# Patient Record
Sex: FEMALE | Race: WHITE | Employment: FULL TIME | URBAN - METROPOLITAN AREA
[De-identification: names, ages, dates, MRNs, and addresses within clinical notes are randomized per-mention and may not be internally consistent; named-entity substitution may affect disease eponyms.]

---

## 2017-01-02 ENCOUNTER — DOCUMENTATION ONLY (OUTPATIENT)
Dept: SLEEP MEDICINE | Age: 57
End: 2017-01-02

## 2017-01-02 ENCOUNTER — OFFICE VISIT (OUTPATIENT)
Dept: SLEEP MEDICINE | Age: 57
End: 2017-01-02

## 2017-01-02 VITALS — BODY MASS INDEX: 36.44 KG/M2 | HEIGHT: 62 IN | WEIGHT: 198 LBS

## 2017-01-02 DIAGNOSIS — G47.33 OSA (OBSTRUCTIVE SLEEP APNEA): Primary | ICD-10-CM

## 2017-01-02 NOTE — PROGRESS NOTES
217 [facility] Gael. [place], [address]  Tel.  [phone]  Fax. 100 [facility] 60  [place], [address] [facility]  Tel.  [phone]  Fax. [phone] [address]   Tel.  [phone]  Fax. [phone]         Subjective:      Naye Mcduffie is an 64 y.o. female referred for evaluation for a sleep disorder. She complains of snoring associated with snorting, periods of not breathing. Symptoms began several years ago, gradually worsening since that time. She usually can fall asleep in 5 minutes. Family or house members note snoring, snorting, periods of not breathing. She denies completely or partially paralyzed while falling asleep or waking up. Naye Mcduffie does wake up frequently at night. She is not bothered by waking up too early and left unable to get back to sleep. She actually sleeps about 7 hours at night and wakes up about 3 times during the night. She does not work shifts:  .   Feliz Pill indicates she does not get too little sleep at night. Her bedtime is 2200. She awakens at 0700. She does not take naps. She has the following observed behaviors: Light snoring, Grinding teeth;  . Other remarks: waking with gasp - severe episode early October and a milder episode last week. [place] Sleepiness Score: 4     Allergies   Allergen Reactions    Sulfa (Sulfonamide Antibiotics) Rash         Current Outpatient Prescriptions:     OTHER, , Disp: , Rfl:     Pseudoephedrine-Ibuprofen (ADVIL COLD AND SINUS)  mg cap, Take  by mouth., Disp: , Rfl:     ondansetron (ZOFRAN ODT) 4 mg disintegrating tablet, Take 1 Tab by mouth every eight (8) hours as needed for Nausea., Disp: 20 Tab, Rfl: 0     She  has a past medical history of Abnormal antinuclear antibody titer (5/9/2010); Abnormal Pap smear (5/9/2010); Environmental allergies (5/9/2010); TMJ (dislocation of temporomandibular joint) (5/9/2010); and Trauma.  She also has no past medical history of Abuse; Anemia NEC; Arrhythmia; Calculus of kidney; Chronic pain; Congestive heart failure, unspecified; Contact dermatitis and other eczema, due to unspecified cause; Depression; GERD (gastroesophageal reflux disease); Headache(784.0); Hypercholesterolemia; Psychotic disorder; or Thyroid disease. She  has no past surgical history on file. She family history includes Heart Disease in her father. She  reports that she has quit smoking. She smoked 0.25 packs per day. She has never used smokeless tobacco. She reports that she does not drink alcohol or use illicit drugs. Review of Systems:  Constitutional:  No significant weight loss or weight gain  Eyes:  No blurred vision  CVS:  No significant chest pain  Pulm:  No significant shortness of breath  GI:  No significant nausea or vomiting  :  No significant nocturia  Musculoskeletal:  No significant joint pain at night  Skin:  No significant rashes  Neuro:  No significant dizziness   Psych:  No active mood issues    Sleep Review of Systems: notable for no difficulty falling asleep; frequent awakenings at night;  regular dreaming noted; no nightmares ; no early morning headaches; no memory problems; no concentration issues; no history of any automobile or occupational accidents due to daytime drowsiness. Objective:     Visit Vitals     5' 2\" (1.575 m)    Wt 198 lb (89.8 kg)    LMP 07/05/2011    BMI 36.21 kg/m2         General:   Not in acute distress   Eyes:  Anicteric sclerae, no obvious strabismus   Nose:  No obvious nasal septum deviation    Oropharynx:   Class 3 oropharyngeal outlet, thick tongue base, uvula could not be seen due to low-lying soft palate, narrow tonsilo-pharyngeal pilars   Tonsils:   tonsils are not seen due to low-lying soft palate   Neck:   Neck circ.  in \"inches\": 13; midline trachea   Chest/Lungs:  Equal lung expansion, clear on auscultation    CVS:  Normal rate, regular rhythm; no JVD   Skin:  Warm to touch; no obvious rashes   Neuro:  No focal deficits ; no obvious tremor    Psych:  Normal affect,  normal countenance;          Assessment:       ICD-10-CM ICD-9-CM    1. GUILLERMO (obstructive sleep apnea) G47.33 327.23 SLEEP STUDY UNATTENDED, 4 CHANNEL   2. BMI 36.0-36.9,adult Z68.36 V85.36          Plan:     * The patient currently has a Moderate Risk for having sleep apnea. STOP-BANG score 4.  * Sleep testing was ordered for initial evaluation. * She was provided information on sleep apnea including coresponding risk factors and the importance of proper treatment. * Treatment options if indicated were reviewed today. Patient agrees to a trial of OAT therapy if indicated. (Dr. Lyons Brothers). * Counseling was provided regarding proper sleep hygiene and safe driving. * Effect of sleep disturbance on weight was reviewed. We have recommended a dedicated weight loss through appropriate diet and an exercise regiment as significant weight reduction has been shown to reduce severity of obstructive sleep apnea. * Telephone [phone]  follow-up shortly after sleep study to review results and plan final management.     (patient has given permission for a message to be left regarding test results and further management if patient cannot be cannot be reached directly). Thank you for allowing us to participate in your patient's medical care. We'll keep you updated on these investigations. Cheko Simental MD, FAASM  Diplomate American Board of Sleep Medicine  Diplomate in Sleep Medicine - ABP  Electronically signed.

## 2017-01-02 NOTE — PATIENT INSTRUCTIONS
217 [facility]., Gael. [place], [address]  Tel.  [phone]  Fax. 100 [facility] 60  [place], [address] [facility]  Tel.  [phone]  Fax. [phone] [address]  Tel.  [phone]  Fax. [phone]     Sleep Apnea: After Your Visit  Your Care Instructions  Sleep apnea occurs when you frequently stop breathing for 10 seconds or longer during sleep. It can be mild to severe, based on the number of times per hour that you stop breathing or have slowed breathing. Blocked or narrowed airways in your nose, mouth, or throat can cause sleep apnea. Your airway can become blocked when your throat muscles and tongue relax during sleep. Sleep apnea is common, occurring in 1 out of 20 individuals. Individuals having any of the following characteristics should be evaluated and treated right away due to high risk and detrimental consequences from untreated sleep apnea:  1. Obesity  2. Congestive Heart failure  3. Atrial Fibrillation  4. Uncontrolled Hypertension  5. Type II Diabetes  6. Night-time Arrhythmias  7. Stroke  8. Pulmonary Hypertension  9. High-risk Driving Populations (pilots, truck drivers, etc.)  10. Patients Considering Weight-loss Surgery    How do you know you have sleep apnea? You probably have sleep apnea if you answer 'yes' to 3 or more of the following questions:  S - Have you been told that you Snore? T - Are you often Tired during the day? O - Has anyone Observed you stop breathing while sleeping? P- Do you have (or are being treated for) high blood Pressure? B - Are you obese (Body Mass Index > 35)? A - Is your Age 48years old or older? N - Is your Neck size greater than 16 inches? G - Are you male Gender? A sleep physician can prescribe a breathing device that prevents tissues in the throat from blocking your airway.  Or your doctor may recommend using a dental device (oral breathing device) to help keep your airway open. In some cases, surgery may be needed to remove enlarged tissues in the throat. Follow-up care is a key part of your treatment and safety. Be sure to make and go to all appointments, and call your doctor if you are having problems. It's also a good idea to know your test results and keep a list of the medicines you take. How can you care for yourself at home? · Lose weight, if needed. It may reduce the number of times you stop breathing or have slowed breathing. · Go to bed at the same time every night. · Sleep on your side. It may stop mild apnea. If you tend to roll onto your back, sew a pocket in the back of your pajama top. Put a tennis ball into the pocket, and stitch the pocket shut. This will help keep you from sleeping on your back. · Avoid alcohol and medicines such as sleeping pills and sedatives before bed. · Do not smoke. Smoking can make sleep apnea worse. If you need help quitting, talk to your doctor about stop-smoking programs and medicines. These can increase your chances of quitting for good. · Prop up the head of your bed 4 to 6 inches by putting bricks under the legs of the bed. · Treat breathing problems, such as a stuffy nose, caused by a cold or allergies. · Use a continuous positive airway pressure (CPAP) breathing machine if lifestyle changes do not help your apnea and your doctor recommends it. The machine keeps your airway from closing when you sleep. · If CPAP does not help you, ask your doctor whether you should try other breathing machines. A bilevel positive airway pressure machine has two types of air pressureâone for breathing in and one for breathing out. Another device raises or lowers air pressure as needed while you breathe. · If your nose feels dry or bleeds when using one of these machines, talk with your doctor about increasing moisture in the air. A humidifier may help.   · If your nose is runny or stuffy from using a breathing machine, talk with your doctor about using decongestants or a corticosteroid nasal spray. When should you call for help? Watch closely for changes in your health, and be sure to contact your doctor if:  · You still have sleep apnea even though you have made lifestyle changes. · You are thinking of trying a device such as CPAP. · You are having problems using a CPAP or similar machine. Where can you learn more? Go to [employer]. Enter I344 in the search box to learn more about \"Sleep Apnea: After Your Visit. \"   © 6597-5955 Healthwise, [employer]. Care instructions adapted under license by Karla Gomez (which disclaims liability or warranty for this information). This care instruction is for use with your licensed healthcare professional. If you have questions about a medical condition or this instruction, always ask your healthcare professional. Raymond Littleon any warranty or liability for your use of this information. PROPER SLEEP HYGIENE    What to avoid  · Do not have drinks with caffeine, such as coffee or black tea, for 8 hours before bed. · Do not smoke or use other types of tobacco near bedtime. Nicotine is a stimulant and can keep you awake. · Avoid drinking alcohol late in the evening, because it can cause you to wake in the middle of the night. · Do not eat a big meal close to bedtime. If you are hungry, eat a light snack. · Do not drink a lot of water close to bedtime, because the need to urinate may wake you up during the night. · Do not read or watch TV in bed. Use the bed only for sleeping and sexual activity. What to try  · Go to bed at the same time every night, and wake up at the same time every morning. Do not take naps during the day. · Keep your bedroom quiet, dark, and cool. · Get regular exercise, but not within 3 to 4 hours of your bedtime. .  · Sleep on a comfortable pillow and mattress.   · If watching the clock makes you anxious, turn it facing away from you so you cannot see the time. · If you worry when you lie down, start a worry book. Well before bedtime, write down your worries, and then set the book and your concerns aside. · Try meditation or other relaxation techniques before you go to bed. · If you cannot fall asleep, get up and go to another room until you feel sleepy. Do something relaxing. Repeat your bedtime routine before you go to bed again. · Make your house quiet and calm about an hour before bedtime. Turn down the lights, turn off the TV, log off the computer, and turn down the volume on music. This can help you relax after a busy day. Drowsy Driving  The [address] [place] [address] Road Traffic Safety Administration cites drowsiness as a causing factor in more than 921,543 police reported crashes annually, resulting in 76,000 injuries and 1,500 deaths. Other surveys suggest 55% of people polled have driven while drowsy in the past year, 23% had fallen asleep but not crashed, 3% crashed, and 2% had and accident due to drowsy driving. Who is at risk? Young Drivers: One study of drowsy driving accidents states that 55% of the drivers were under 25 years. Of those, 75% were male. Shift Workers and Travelers: People who work overnight or travel across time zones frequently are at higher risk of experiencing Circadian Rhythm Disorders. They are trying to work and function when their body is programed to sleep. Sleep Deprived: Lack of sleep has a serious impact on your ability to pay attention or focus on a task. Consistently getting less than the average of 8 hours your body needs creates partial or cumulative sleep deprivation. Untreated Sleep Disorders: Sleep Apnea, Narcolepsy, R.L.S., and other sleep disorders (untreated) prevent a person from getting enough restful sleep. This leads to excessive daytime sleepiness and increases the risk for drowsy driving accidents by up to 7 times.   Medications / Alcohol: Even over the counter medications can cause drowsiness. Medications that impair a drivers attention should have a warning label. Alcohol naturally makes you sleepy and on its own can cause accidents. Combined with excessive drowsiness its effects are amplified. Signs of Drowsy Driving:   * You don't remember driving the last few miles   * You may drift out of your vahid   * You are unable to focus and your thoughts wander   * You may yawn more often than normal   * You have difficulty keeping your eyes open / nodding off   * Missing traffic signs, speeding, or tailgating  Prevention-   Good sleep hygiene, lifestyle and behavioral choices have the most impact on drowsy driving. There is no substitute for sleep and the average person requires 8 hours nightly. If you find yourself driving drowsy, stop and sleep. Consider the sleep hygiene tips provided during your visit as well. Medication Refill Policy: Refills for all medications require 1 week advance notice. Please have your pharmacy fax a refill request. We are unable to fax, or call in \"controled substance\" medications and you will need to pick these prescriptions up from our office. [employer] Activation    Thank you for requesting access to [employer]. Please follow the instructions below to securely access and download your online medical record. [employer] allows you to send messages to your doctor, view your test results, renew your prescriptions, schedule appointments, and more. How Do I Sign Up? 1. In your internet browser, go to [email or web address]. 2. Click on the First Time User? Click Here link in the Sign In box. You will see the New Member Sign Up page. 3. Enter your [employer] Access Code exactly as it appears below. You will not need to use this code after youve completed the sign-up process. If you do not sign up before the expiration date, you must request a new code.     [employer] Access Code: [email or web address]  Expires: 4/2/2017 12:46 PM (This is the date your [employer] access code will )    4. Enter the last four digits of your Social Security Number (xxxx) and Date of Birth (mm/dd/yyyy) as indicated and click Submit. You will be taken to the next sign-up page. 5. Create a PhilSmilet ID. This will be your Traxpay login ID and cannot be changed, so think of one that is secure and easy to remember. 6. Create a [employer] password. You can change your password at any time. 7. Enter your Password Reset Question and Answer. This can be used at a later time if you forget your password. 8. Enter your e-mail address. You will receive e-mail notification when new information is available in [address] 9. Click Sign Up. You can now view and download portions of your medical record. 10. Click the Download Summary menu link to download a portable copy of your medical information. Additional Information    If you have questions, please call [phone]. Remember, [employer]xpay is NOT to be used for urgent needs. For medical emergencies, dial 911.

## 2017-01-02 NOTE — PROGRESS NOTES
· Patient was educated on proper hookup and operation of the HST. · Instruction forms and documentation were reviewed and signed. · The patient demonstrated good understanding of the HST. · Insurance has not yet authorized use of HST at this time. We will contact patient to pickup the device once authorization is obtained.

## 2017-01-03 ENCOUNTER — TELEPHONE (OUTPATIENT)
Dept: SLEEP MEDICINE | Age: 57
End: 2017-01-03

## 2017-01-03 NOTE — TELEPHONE ENCOUNTER
Left message to call back to schedule . Does not need education. Also she will have to come after 10th January as the sleep study has been approved from 11th January - 10th April 2017.

## 2017-01-10 ENCOUNTER — DOCUMENTATION ONLY (OUTPATIENT)
Dept: SLEEP MEDICINE | Age: 57
End: 2017-01-10

## 2017-01-10 NOTE — PROGRESS NOTES
Patient picked up HSAT in office today, notified patient to start sleep study 1/11/17 per insurance coverage dates. Patient will return HST 1/12/17.  Patient verbalized understanding

## 2017-01-12 ENCOUNTER — HOSPITAL ENCOUNTER (OUTPATIENT)
Dept: SLEEP MEDICINE | Age: 57
Discharge: HOME | End: 2017-01-12
Payer: COMMERCIAL

## 2017-01-12 PROCEDURE — 95806 SLEEP STUDY UNATT&RESP EFFT: CPT | Performed by: INTERNAL MEDICINE

## 2017-01-13 ENCOUNTER — TELEPHONE (OUTPATIENT)
Dept: SLEEP MEDICINE | Age: 57
End: 2017-01-13

## 2017-01-13 DIAGNOSIS — G47.33 OSA (OBSTRUCTIVE SLEEP APNEA): Primary | ICD-10-CM

## 2017-01-13 NOTE — TELEPHONE ENCOUNTER
HSAT Returned    Date of Study: 1/12/17    The following information was gathered from the patients study log:    · Lights off: 11:00p  · Estimated sleep onset: 11:15p    · Awakened a total of 2-3 times  · The patient felt they slept 7 hours  · Patient took no sleep aid before starting the test  · Sleep quality was worse compared to a usual nights sleep. Further information provided: \" was out of town. Maybe that's why I kept waking up. However, if he were there, his snoring would wake me. Also had neck pain from earlier in the week. \"

## 2017-01-13 NOTE — TELEPHONE ENCOUNTER
Home sleep testing was performed and the results of the study were explained to the patient. Please refer to interpretation report for further details. Apnea/Hypopnea index of 17.5, SpO2 yanick was 64% which indicates moderate apnea. She continues to have snoring and wakes up gasping for air. Orders Placed This Encounter    REFERRAL TO DENTISTRY     Referral Priority:   Routine     Referral Type:   Consultation     Referral Reason:   Specialty Services Required     Referred to Provider:   Terrence Guzman DMD     Number of Visits Requested:   1         [facility] was provided information on sleep apnea including coresponding risk factors and the importance of proper treatment including positional therapy (tennis ball technique discussed). * We have referred the patient to Dentistry (Dr. Yuly Ruvalcaba) for oral appliance evaluation.

## 2017-01-16 ENCOUNTER — DOCUMENTATION ONLY (OUTPATIENT)
Dept: SLEEP MEDICINE | Age: 57
End: 2017-01-16

## 2017-02-09 ENCOUNTER — DOCUMENTATION ONLY (OUTPATIENT)
Dept: SLEEP MEDICINE | Age: 57
End: 2017-02-09

## 2017-02-12 ENCOUNTER — OFFICE VISIT (OUTPATIENT)
Dept: FAMILY MEDICINE CLINIC | Age: 57
End: 2017-02-12

## 2017-02-12 VITALS
WEIGHT: 198 LBS | SYSTOLIC BLOOD PRESSURE: 138 MMHG | HEIGHT: 62 IN | BODY MASS INDEX: 36.44 KG/M2 | DIASTOLIC BLOOD PRESSURE: 71 MMHG | TEMPERATURE: 98 F | RESPIRATION RATE: 16 BRPM | OXYGEN SATURATION: 97 % | HEART RATE: 73 BPM

## 2017-02-12 DIAGNOSIS — Z12.11 COLON CANCER SCREENING: ICD-10-CM

## 2017-02-12 DIAGNOSIS — Z13.220 LIPID SCREENING: ICD-10-CM

## 2017-02-12 DIAGNOSIS — Z11.59 NEED FOR HEPATITIS C SCREENING TEST: ICD-10-CM

## 2017-02-12 DIAGNOSIS — F41.0 ANXIETY ATTACK: ICD-10-CM

## 2017-02-12 DIAGNOSIS — Z13.31 DEPRESSION SCREEN: ICD-10-CM

## 2017-02-12 DIAGNOSIS — H93.239 HEARING ABNORMALLY ACUTE, UNSPECIFIED LATERALITY: Primary | ICD-10-CM

## 2017-02-12 RX ORDER — BUSPIRONE HYDROCHLORIDE 5 MG/1
5 TABLET ORAL
Qty: 30 TAB | Refills: 1 | Status: SHIPPED | OUTPATIENT
Start: 2017-02-12 | End: 2018-02-05 | Stop reason: ALTCHOICE

## 2017-02-12 NOTE — LETTER
2/12/2017 8:46 AM 
 
Ms. Ej Corral [address] [id number] Focus on regular exercise (150 minutes each week) and healthy eating. Eat more fruits and vegetables. Eat more protein (egg whites, beans, and nuts you know you tolerate) and less carbohydrates (white bread, white rice, white pasta, white potatoes, sodas, and sweets). Eat appropriately small portion sizes. Sincerely, Jake Ramirez MD

## 2017-02-12 NOTE — MR AVS SNAPSHOT
Visit Information Johnson Peabody y [place] Personal Médico Departamento Teléfono del Dep. Número de visita 2/12/2017  8:15 AM Zbigniew Flores MD [facility] [phone] Upcoming Health Maintenance Date Due FOBT Q 1 YEAR AGE 50-75 2/12/2018 BREAST CANCER SCRN MAMMOGRAM 9/1/2018 PAP AKA CERVICAL CYTOLOGY 9/1/2019 DTaP/Tdap/Td series (3 - Td) 10/3/2025 Alergias  Review Complete El: 2/12/2017 Por: MD Kathia Klineah Moldovan del:  2/12/2017 Intensidad Anotado Tipo de reacción [employer] Sulfa (Sulfonamide Antibiotics)  05/09/2010    Rash Vacunas actuales Revisadas el:  9/29/2010 Lisa Leisure TD Vaccine 10/24/2009 TDAP Vaccine 9/29/2010 Td, Adsorbed PF 10/3/2015 No revisadas esta visita You Were Diagnosed With   
  
 Rinda Porum Hearing abnormally acute, unspecified laterality    -  Primary ICD-10-CM: D91.654 ICD-9-CM: 989.27 Need for hepatitis C screening test     ICD-10-CM: Z11.59 
ICD-9-CM: V73.89 Lipid screening     ICD-10-CM: D21.460 ICD-9-CM: V77.91 Colon cancer screening     ICD-10-CM: Z12.11 ICD-9-CM: V76.51 Anxiety attack     ICD-10-CM: F41.0 ICD-9-CM: 300.01 Depression screen     ICD-10-CM: Z13.89 ICD-9-CM: V79.0 Partes vitales PS Pulso Temperatura Resp [place] ( percentil de crecimiento) Peso (percentil de crecimiento)  
 138/71 (BP 1 Location: Right arm, BP Patient Position: Sitting) 73 98 °F (36.7 °C) (Oral) 16 5' 2\" (1.575 m) 198 lb (89.8 kg) LMP (última amol) SpO2 BMI (IMC) Estado obstétrico Estatus de tabaquísmo 07/05/2011 97% 36.21 kg/m2 Postmenopausal Former Smoker BMI and BSA Data Body Mass Index Body Surface Area  
 36.21 kg/m 2 1.98 m 2 [facility] Name Phone [address], [address] [phone] Justin lista de medicamentos actualizada Lista actualizada el: 2/12/17  8:55 AM.  Jeanie Griffith use reynoso lista de medicamentos más reciente. busPIRone 5 mg tablet También conocido bill:  BUSPAR Take 1 Tab by mouth three (3) times daily as needed. Impresion de recetas Refills  
 busPIRone (BUSPAR) 5 mg tablet 1 Sig: Take 1 Tab by mouth three (3) times daily as needed. Class: Print Route: Oral  
  
Hicimos lo siguiente AMB POC FECAL BLOOD, OCCULT, QL 3 CARDS [56422 CPT(R)] CBC W/O DIFF [50946 CPT(R)] CHOLESTEROL, HDL [id number] CPT(R)] CHOLESTEROL, TOTAL [18693 CPT(R)] HEPATITIS C AB [17136 CPT(R)] LDL, DIRECT [id number] CPT(R)] METABOLIC PANEL, COMPREHENSIVE [52488 CPT(R)] VT PT-FOCUSED HLTH RISK ASSMT SCORE DOC STND INSTRM [75289 CPT(R)] REFERRAL TO GASTROENTEROLOGY [AUD36 Custom] Comentarios:  
 Please evaluate patient for colonoscopy. TSH 3RD GENERATION [29305 CPT(R)] Informacion de [employer] de Referencia Referido por Referido a  
  
 [id number] Todd Mcginnis [facility] [place] [facility] AT [facility] [address] Phone: [phone] Fax: [phone] Visitas Estado Regine Myers de inicio Regine Myers final  
 1 New Request 2/12/17 2/12/18 Si reynoso referencia tiene un estado de \"pending review\" o \"denied\" , informacion adicional sera enviada para apoyar el resultado de esta decision. Instrucciones para el Paciente Labs soon Keep up with female exams Use buspar as needed for anxiety Follow for CPAP with sleep specialist 
 
Obtain colonoscopy Return your stool cards when ready Please call Chew Samples to help arrange and authorize any tests and/or referrals. Her # is [phone] Central Scheduling at [facility] is [phone] Introducing [facility] SERVICES! [facility] introduce portal paciente MyChart .  Ahora se puede acceder a partes de reynoso expediente médico, enviar por correo electrónico la oficina de reynoso médico y solicitar renovaciones de medicamentos en línea. En reynoso navegador de Internet , Liv Ro a [email or web address] Rosy clic en el usuario por Brook Ankush? Lisandro Saucedo clic aquí en la sesión Chandana Waggoner. Verá la página de registro [place]. Ingrese reynoso código de Bank [facility] ranjana y bill aparece a continuación. Usted no tendrá que [employer] código después de lakesha completado el proceso de registro . Si usted no se inscribe antes de la fecha de caducidad , debe solicitar un nuevo código. · MyChart Código de acceso : [id number] Expires: 4/2/2017 12:46 PM 
 
Ingresa los últimos cuatro dígitos de reynoso Número de Seguro Social ( xxxx ) y fecha de nacimiento ( dd / mm / aaaa ) bill se indica y rosy clic en Enviar. Usted será llevado a la siguiente página de registro . Crear un ID MyChart . Esta será reynoso ID de inicio de sesión de MyChart y no puede ser [place] , por lo que pensar en marion que es Cara Joseluis y fácil de recordar . Crear marion contraseña MyChart . Usted puede cambiar reynoso contraseña en cualquier momento . Ingrese reynoso Password Reset de preguntas y [place] . [place] se puede utilizar en un momento posterior si usted olvida reynoso contraseña. Introduzca reynoso dirección de correo electrónico . Cheryal Rinne recibirá marion notificación por correo electrónico cuando la nueva información está disponible en MyChart . Robbie Cisse clic en Registrarse. Candido Johnson tyra y descargar porciones de reynoso expediente médico. 
Rosy clic en el enlace de descarga del menú Resumen para descargar marion copia portátil de reynoso información médica . Si tiene [employer] , por favor visite la sección de preguntas frecuentes del sitio web MyChart . Recuerde, MyChart NO es que se utilizará para las necesidades urgentes. Para emergencias médicas , llame al 911 . Ahora disponible en reynoso iPhone y Android ! Por favor proporcione konstantin resumen de la documentación de cuidado a reynoso próximo proveedor. Your primary care clinician is listed as Stu Maza. If you have any questions after today's visit, please call [phone].

## 2017-02-12 NOTE — PROGRESS NOTES
Erin Hayes is a 64 y.o. female      Issues discussed today include:        Signs and symptoms:  Hyperacute hearing  Duration:  One week ago  Context:  Made her anxious  Location:  Both ears  Quality:  Everything was loud and it hurt  Severity:  significant  Timing:  Has resolved  Modifying factors:  She has TMJ and is currently being evaluated for GUILLERMO. She took one of her husbands SALLY and felt better    Data reviewed or ordered today:  Labs soon    Other problems include:  Patient Active Problem List   Diagnosis Code    TMJ (dislocation of temporomandibular joint) S03. 00XA    Abnormal antinuclear antibody titer R76.8    DDD (degenerative disc disease) [id number]    Abnormal Pap smear [id number]    Unspecified vitamin D deficiency E55.9    FH: diabetes mellitus Z83.3    History of normal resting EKG [id number]    Macrocytosis without anemia D75.89    Lipid disorder E78.9    Chest pain, unspecified R07.9    HTN (hypertension) I10    Dizzy R42    Acute diverticulitis K57.92    Obesity (BMI 35.0-39.9 without comorbidity) (HCC) E66.9       Medications:  Current Outpatient Prescriptions   Medication Sig Dispense Refill    busPIRone (BUSPAR) 5 mg tablet Take 1 Tab by mouth three (3) times daily as needed. 30 Tab 1       Allergies: Allergies   Allergen Reactions    Sulfa (Sulfonamide Antibiotics) Rash       LMP:  Patient's last menstrual period was 07/05/2011. Social History     Social History    Marital status:      Spouse name: N/A    Number of children: N/A    Years of education: N/A     Occupational History    Not on file.      Social History Main Topics    Smoking status: Former Smoker     Packs/day: 0.25    Smokeless tobacco: Never Used      Comment: quit in 2016 (started in late in 45s)    Alcohol use No      Comment: ocassional    Drug use: No    Sexual activity: Yes     Partners: Male     Other Topics Concern    Not on file     Social History Narrative         Family History Problem Relation Age of Onset    Heart Disease Father          Meaningful use:  done      ROS:  Headaches:  no  Chest Pain:  no  SOB:  no  Fevers:  no  Other significant ROS:      Patient's last menstrual period was 07/05/2011. Physical Exam  Visit Vitals    /71 (BP 1 Location: Right arm, BP Patient Position: Sitting)    Pulse 73    Temp 98 °F (36.7 °C) (Oral)    Resp 16    Ht 5' 2\" (1.575 m)    Wt 198 lb (89.8 kg)    LMP 07/05/2011    SpO2 97%    BMI 36.21 kg/m2     BP Readings from Last 3 Encounters:   02/12/17 138/71   10/15/15 122/77   10/12/15 149/80     Constitutional:  Appears well,  No Acute Distress, Vitals noted  Psychiatric:   Affect normal, Alert and cooperative, Oriented to person/place/time    Eyes:   Pupils equally round and reactive, EOMI, conjunctiva clear, eyelids normal  ENT:   External ears and nose normal/lips, teeth=OK/gums normal, TMs and Orophyarynx normal  Neck:   general inspection and Thyroid normal.  No abnormal cervical or supraclavicular nodes    Lungs:   clear to auscultation, good respiratory effort  Heart: Ausculation normal.  Regular rhythm. No cardiac murmurs.   No carotid bruits or palpable thrills  Chest wall normal  Abd:  benign  Extremities:   without edema, good peripheral pulses  Skin:   Warm to palpation, without rashes, bruising, or suspicious lesions   Neuro:  No facial droop, speech fluent, EOMI, Pupils equally round and reactive to light, visual fields seem OK, hearing seems normal and symmetrical,smile symmetrical, puffs out cheeks symmetrically    Shoulder shrug symmetrical     moves all extremities, strength/sensationseem intact and symmetrical    Rapid alternating movements of hands normal and symmetrical    balance seems OK, no pronator drift, gait normal. \"get up and go\" test OK    squats OK, heel standing/toe standing OK    no tenderness of C spine, T spine, LS spine, flexion/extension of spine OK    Affect seems appropriate, no obvious mental processing problems    MSK:  Full ROM all joints                  Assessment:    Patient Active Problem List   Diagnosis Code    TMJ (dislocation of temporomandibular joint) S03. 00XA    Abnormal antinuclear antibody titer R76.8    DDD (degenerative disc disease) [id number]    Abnormal Pap smear [id number]    Unspecified vitamin D deficiency E55.9    FH: diabetes mellitus Z83.3    History of normal resting EKG [id number]    Macrocytosis without anemia D75.89    Lipid disorder E78.9    Chest pain, unspecified R07.9    HTN (hypertension) I10    Dizzy R42    Acute diverticulitis K57.92    Obesity (BMI 35.0-39.9 without comorbidity) (HCC) E66.9       Today's diagnoses are:    ICD-10-CM ICD-9-CM    1. Hearing abnormally acute, unspecified laterality H93.239 388.42 TSH 3RD GENERATION      CBC W/O DIFF      METABOLIC PANEL, COMPREHENSIVE   2. Need for hepatitis C screening test Z11.59 V73.89 HEPATITIS C AB   3. Lipid screening Z13.220 V77.91 CHOLESTEROL, HDL      CHOLESTEROL, TOTAL      LDL, DIRECT   4. Colon cancer screening Z12.11 V76.51 REFERRAL TO GASTROENTEROLOGY      AMB POC FECAL BLOOD, OCCULT, QL 3 CARDS   5. Anxiety attack F41.0 300.01 busPIRone (BUSPAR) 5 mg tablet   6. Depression screen Z13.89 V79.0 NM PT-FOCUSED HLTH RISK ASSMT SCORE DOC STND INSTRM       Plan:  Orders Placed This Encounter    HEPATITIS C AB    TSH 3RD GENERATION    CHOLESTEROL, HDL    CHOLESTEROL, TOTAL    LDL, DIRECT    CBC W/O DIFF    METABOLIC PANEL, COMPREHENSIVE    REFERRAL TO GASTROENTEROLOGY     Referral Priority:   Routine     Referral Type:   Consultation     Referral Reason:   Specialty Services Required     Referral Location:   [facility]     Referred to Provider:   Aurea Bonilla MD    AMB FECAL OCCULT BLOOD QL-3 CARDS    NM PT-FOCUSED HLTH RISK ASSMT SCORE DOC STND INSTRM    busPIRone (BUSPAR) 5 mg tablet     Sig: Take 1 Tab by mouth three (3) times daily as needed.      Dispense:  30 Tab Refill:  1       See patient instructions  Patient Instructions   Labs soon    Keep up with female exams    Use buspar as needed for anxiety    Follow for CPAP with sleep specialist    Obtain colonoscopy    Return your stool cards when ready    Please call Alisia to help arrange and authorize any tests and/or referrals.   Her # is [phone] at Karla Leggett is [phone]            refresh note:  done    AVS Printed:  done      We discussed health maintenance/diet/exercise/weight loss

## 2017-02-12 NOTE — PATIENT INSTRUCTIONS
Labs soon    Keep up with female exams    Use buspar as needed for anxiety    Follow for CPAP with sleep specialist    Obtain colonoscopy    Return your stool cards when ready    Please call Nura Escobar to help arrange and authorize any tests and/or referrals.   Her # is [phone] at Cherl Grain is #[phone]

## 2017-02-12 NOTE — LETTER
2/14/2017 2:36 PM 
 
Ms. Michela Holguin [address] [id number] Dear Michela Holguin: 
 
Please find your most recent results below. Resulted Orders HEPATITIS C AB Result Value Ref Range Hep C Virus Ab <0.1 0.0 - 0.9 s/co ratio Comment:  
                                     Negative:     < 0.8 Indeterminate: 0.8 - 0.9 Positive:     > 0.9 The CDC recommends that a positive HCV antibody result 
 be followed up with a HCV Nucleic Acid Amplification 
 test (179588). Narrative Performed at:  [address]  [id number] : Nasreen Vega MD, Phone:  [phone] TSH 3RD GENERATION Result Value Ref Range TSH 1.080 0.450 - 4.500 uIU/mL Narrative Performed at:  [address]  [id number] : Nasreen Vega MD, Phone:  [phone] CHOLESTEROL, HDL Result Value Ref Range HDL Cholesterol 51 >39 mg/dL Narrative Performed at:  [address]  [id number] : Nasreen Vega MD, Phone:  [phone] CHOLESTEROL, TOTAL Result Value Ref Range Cholesterol, total 235 (H) 100 - 199 mg/dL Narrative Performed at:  [address]  [id number] : Nasreen Vega MD, Phone:  [phone] LDL, DIRECT Result Value Ref Range LDL,Direct 165 (H) 0 - 99 mg/dL Narrative Performed at:  [address]  [id number] : Nasreen Vega MD, Phone:  [phone] CBC W/O DIFF Result Value Ref Range WBC 4.3 3.4 - 10.8 x10E3/uL  
 RBC 4.00 3.77 - 5.28 x10E6/uL HGB 12.6 11.1 - 15.9 g/dL HCT 38.3 34.0 - 46.6 % MCV 96 79 - 97 fL  
 MCH 31.5 26.6 - 33.0 pg  
 MCHC 32.9 31.5 - 35.7 g/dL  
 RDW 13.3 12.3 - 15.4 % PLATELET 542 592 - 210 x10E3/uL Narrative Performed at:  [address]  [id number] : Fidelina Hunter MD, Phone:  [phone] METABOLIC PANEL, COMPREHENSIVE Result Value Ref Range Glucose 97 65 - 99 mg/dL BUN 13 6 - 24 mg/dL Creatinine 0.85 0.57 - 1.00 mg/dL GFR est non-AA 77 >59 mL/min/1.73 GFR est AA 89 >59 mL/min/1.73  
 BUN/Creatinine ratio 15 9 - 23 Sodium 140 134 - 144 mmol/L Potassium 4.4 3.5 - 5.2 mmol/L Chloride 102 96 - 106 mmol/L  
 CO2 24 18 - 29 mmol/L Calcium 9.5 8.7 - 10.2 mg/dL Protein, total 6.8 6.0 - 8.5 g/dL Albumin 4.4 3.5 - 5.5 g/dL GLOBULIN, TOTAL 2.4 1.5 - 4.5 g/dL A-G Ratio 1.8 1.1 - 2.5 Comment: **Effective March 13, 2017 the reference interval** 
  for A/G Ratio will be changing to: Age                Male          Female 0 -  7 days       1.1 - 2.3       1.1 - 2.3 
          8 - 30 days       1.2 - 2.8       1.2 - 2.8 
          1 -  6 months     1.3 - 3.6       1.3 - 3.6 
   7 months -  5 years      1.5 - 2.6       1.5 - 2.6 
             > 5 years      1.2 - 2.2       1.2 - 2.2 Bilirubin, total 0.4 0.0 - 1.2 mg/dL Alk. phosphatase 65 39 - 117 IU/L  
 AST (SGOT) 17 0 - 40 IU/L  
 ALT (SGPT) 13 0 - 32 IU/L Narrative Performed at:  [address]  [id number] : Fidelina Hunter MD, Phone:  [phone] CVD REPORT Result Value Ref Range INTERPRETATION Note Comment:  
   Supplement report is available. Narrative Performed at:  [address] [place] [address]  [id number] : Fanny Ayala PhD, Phone:  [phone] Your labs look OK.   
 
You have a 3.2 % chance of developing heart disease based on your current risks.  Because of this I suggest:  Focus on regular exercise (150 minutes each week) and healthy eating.  Eat more fruits and vegetables.  Eat more protein (egg whites, beans, and nuts you know you tolerate) and less carbohydrates (white bread, white rice, white pasta, white potatoes, sodas, and sweets).  Eat appropriately small portion sizes. Avoid tobacco products Sincerely, Alejandra Carty MD

## 2017-02-13 ENCOUNTER — LAB ONLY (OUTPATIENT)
Dept: FAMILY MEDICINE CLINIC | Age: 57
End: 2017-02-13

## 2017-02-14 PROBLEM — E78.00 ELEVATED LDL CHOLESTEROL LEVEL: Status: ACTIVE | Noted: 2017-02-14

## 2017-02-14 LAB
ALBUMIN SERPL-MCNC: 4.4 G/DL (ref 3.5–5.5)
ALBUMIN/GLOB SERPL: 1.8 {RATIO} (ref 1.1–2.5)
ALP SERPL-CCNC: 65 IU/L (ref 39–117)
ALT SERPL-CCNC: 13 IU/L (ref 0–32)
AST SERPL-CCNC: 17 IU/L (ref 0–40)
BILIRUB SERPL-MCNC: 0.4 MG/DL (ref 0–1.2)
BUN SERPL-MCNC: 13 MG/DL (ref 6–24)
BUN/CREAT SERPL: 15 (ref 9–23)
CALCIUM SERPL-MCNC: 9.5 MG/DL (ref 8.7–10.2)
CHLORIDE SERPL-SCNC: 102 MMOL/L (ref 96–106)
CHOLEST SERPL-MCNC: 235 MG/DL (ref 100–199)
CO2 SERPL-SCNC: 24 MMOL/L (ref 18–29)
CREAT SERPL-MCNC: 0.85 MG/DL (ref 0.57–1)
ERYTHROCYTE [DISTWIDTH] IN BLOOD BY AUTOMATED COUNT: 13.3 % (ref 12.3–15.4)
GLOBULIN SER CALC-MCNC: 2.4 G/DL (ref 1.5–4.5)
GLUCOSE SERPL-MCNC: 97 MG/DL (ref 65–99)
HCT VFR BLD AUTO: 38.3 % (ref 34–46.6)
HCV AB S/CO SERPL IA: <0.1 S/CO RATIO (ref 0–0.9)
HDLC SERPL-MCNC: 51 MG/DL
HGB BLD-MCNC: 12.6 G/DL (ref 11.1–15.9)
INTERPRETATION, 910389: NORMAL
LDLC SERPL DIRECT ASSAY-MCNC: 165 MG/DL (ref 0–99)
MCH RBC QN AUTO: 31.5 PG (ref 26.6–33)
MCHC RBC AUTO-ENTMCNC: 32.9 G/DL (ref 31.5–35.7)
MCV RBC AUTO: 96 FL (ref 79–97)
PLATELET # BLD AUTO: 227 X10E3/UL (ref 150–379)
POTASSIUM SERPL-SCNC: 4.4 MMOL/L (ref 3.5–5.2)
PROT SERPL-MCNC: 6.8 G/DL (ref 6–8.5)
RBC # BLD AUTO: 4 X10E6/UL (ref 3.77–5.28)
SODIUM SERPL-SCNC: 140 MMOL/L (ref 134–144)
TSH SERPL DL<=0.005 MIU/L-ACNC: 1.08 UIU/ML (ref 0.45–4.5)
WBC # BLD AUTO: 4.3 X10E3/UL (ref 3.4–10.8)

## 2017-02-14 NOTE — PROGRESS NOTES
Your labs look OK. You have a 3.2 % chance of developing heart disease based on your current risks. Because of this I suggest:  Focus on regular exercise (150 minutes each week) and healthy eating. Eat more fruits and vegetables. Eat more protein (egg whites, beans, and nuts you know you tolerate) and less carbohydrates (white bread, white rice, white pasta, white potatoes, sodas, and sweets). Eat appropriately small portion sizes.     Avoid tobacco products

## 2017-02-16 ENCOUNTER — TELEPHONE (OUTPATIENT)
Dept: SLEEP MEDICINE | Age: 57
End: 2017-02-16

## 2017-02-16 DIAGNOSIS — G47.34 NOCTURNAL HYPOXEMIA: ICD-10-CM

## 2017-02-16 DIAGNOSIS — E66.9 OBESITY (BMI 35.0-39.9 WITHOUT COMORBIDITY): ICD-10-CM

## 2017-02-16 DIAGNOSIS — G47.33 OSA (OBSTRUCTIVE SLEEP APNEA): Primary | ICD-10-CM

## 2017-02-16 NOTE — TELEPHONE ENCOUNTER
Dr Belen Alamo request a call back from Dr Eddie Lockwood in regards to the sleep study for patient.  His office number is [phone]

## 2017-02-20 NOTE — TELEPHONE ENCOUNTER
Home sleep testing was performed and the results of the study were explained to the patient. Please refer to interpretation report for further details. Apnea/Hypopnea index of 17.5 per hour, SpO2 yanick was 64% SpO2 was <88% for 20 minutes during sleep which indicates moderate apnea. She continues to have snoring and periods of not breathing. Encounter Diagnoses   Name Primary?     GUILLERMO (obstructive sleep apnea) Yes    Obesity (BMI 35.0-39.9 without comorbidity) ([facility] [address]     Nocturnal hypoxemia      Orders Placed This Encounter    SLEEP LAB (PAP TITRATION)     Standing Status:   Future     Standing Expiration Date:   8/21/2017     Order Specific Question:   Reason for Exam     Answer:   GUILLERMO

## 2017-03-16 ENCOUNTER — HOSPITAL ENCOUNTER (OUTPATIENT)
Dept: SLEEP MEDICINE | Age: 57
Discharge: HOME | End: 2017-03-16
Payer: COMMERCIAL

## 2017-03-16 DIAGNOSIS — G47.33 OSA (OBSTRUCTIVE SLEEP APNEA): ICD-10-CM

## 2017-03-16 DIAGNOSIS — G47.34 NOCTURNAL HYPOXEMIA: ICD-10-CM

## 2017-03-16 PROCEDURE — 95811 POLYSOM 6/>YRS CPAP 4/> PARM: CPT

## 2017-03-20 ENCOUNTER — TELEPHONE (OUTPATIENT)
Dept: SLEEP MEDICINE | Age: 57
End: 2017-03-20

## 2017-03-20 DIAGNOSIS — G47.33 OSA (OBSTRUCTIVE SLEEP APNEA): Primary | ICD-10-CM

## 2017-03-20 NOTE — TELEPHONE ENCOUNTER
Left message regarding results of Sleep Testing, PAP prescription and with regards to follow-up. Message also indicated to patient to call if there were any further questions regarding their sleep symptoms. Encounter Diagnosis   Name Primary?  GUILLERMO (obstructive sleep apnea) Yes       Orders Placed This Encounter    AMB SUPPLY ORDER     Diagnosis: (G47.33) GUILLERMO (obstructive sleep apnea)  (primary encounter diagnosis)     Positive Airway Pressure Therapy: Duration of need: 99 months. Respironics DreamStation ( Unit - CPAP Mode):   CPAP: 10 cmH2O; CPAP Check enabled. Ramp Time: 30 Minutes; Flex: 2. Remote monitoring enrollment.  Heated Humidifier     Oral/Nasal Combo Mask 1 every 3 months.  Oral Cushion Combo Mask (Replace) 2 per month.  Nasal Pillows Combo Mask (Replace) 2 per month.  Full Face Mask 1 every 3 months.  Full Face Mask Cushion 1 per month.  Nasal Cushion (Replace) 2 per month.  Nasal Pillows (Replace) 2 per month.  Nasal Interface Mask 1 every 3 months.  Headgear 1 every 6 months.  Chinstrap 1 every 6 months.  Tubing 1 every 3 months.  Filter(s) Disposable 2 per month.  Filter(s) Non-Disposable 1 every 6 months.  Oral Interface 1 every 3 months. [address] for Lockshreyaed Kishan (Replace) 1 every 6 months.  Tubing with heating element 1 every 3 months. Perform Mask Fitting per patient preference and comfort - replace as above. Emani Pace MD, FAASM; NPI: [id number]    Electronically signed. Date:- 03-20-17.

## 2017-03-20 NOTE — TELEPHONE ENCOUNTER
----- Message from Blanca Tirado sent at 3/17/2017 11:27 AM EDT -----  Regarding: YUE  According to night tech who worked with Ms. Pardeep Renee during last nights titration, patient was confused as to why she was coming in for a CPAP titration. She thought she was having a diagnostic study. She also had questions about the validity of the HSAT. Tech eventually convinced patient to stay. She had some difficulty acclimating to CPAP at first but eventually did well once asleep. Study is ready for interp.

## 2017-03-21 ENCOUNTER — DOCUMENTATION ONLY (OUTPATIENT)
Dept: SLEEP MEDICINE | Age: 57
End: 2017-03-21

## 2017-03-21 ENCOUNTER — TELEPHONE (OUTPATIENT)
Dept: SLEEP MEDICINE | Age: 57
End: 2017-03-21

## 2017-03-21 NOTE — PROGRESS NOTES
Order [facility]ed to [facility]. Patient wanted to hold off until she speaks with Dr Melody Stock. Telephone encounter routed to Dr Melody Stock. After she confirms that she wants to go ahead with the PAP order, we need to schedule 1st adherence as well.

## 2017-03-21 NOTE — TELEPHONE ENCOUNTER
I called patient to let her know about her PAP order and that we will be faxing it to [facility]. Patient was concerned and she did not feel comfortable at all when she was doing titration. She did she that Dr Jaimee Mckinnon tried calling her. She had fever yesterday and could not  the phone. She is better today and would like to talk to Dr Jaimee Mckinnon before proceeding with the order. Please call her back. She will be at home today.

## 2017-10-10 ENCOUNTER — OFFICE VISIT (OUTPATIENT)
Dept: FAMILY MEDICINE CLINIC | Age: 57
End: 2017-10-10

## 2017-10-10 VITALS
HEIGHT: 62 IN | HEART RATE: 71 BPM | WEIGHT: 199 LBS | RESPIRATION RATE: 18 BRPM | OXYGEN SATURATION: 99 % | BODY MASS INDEX: 36.62 KG/M2 | SYSTOLIC BLOOD PRESSURE: 137 MMHG | TEMPERATURE: 98.1 F | DIASTOLIC BLOOD PRESSURE: 78 MMHG
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
## 2017-10-10 DIAGNOSIS — R59.9 REACTIVE LYMPHADENOPATHY: ICD-10-CM

## 2017-10-10 RX ORDER — DOXYCYCLINE 100 MG/1
100 TABLET ORAL 2 TIMES DAILY
Qty: 20 TAB | Refills: 0 | Status: SHIPPED | OUTPATIENT
Start: 2017-10-10 | End: 2017-10-20
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
## 2017-10-10 NOTE — MR AVS SNAPSHOT
Visit Information Tanishayulissa Perez Personal Médico Departamento Teléfono del Dep. Número de visita 10/10/2017  3:00 PM Caty Schmitz MD [facility] [phone] Upcoming Health Maintenance Date Due FOBT Q 1 YEAR AGE 50-75 2/12/2018 BREAST CANCER SCRN MAMMOGRAM 9/1/2018 PAP AKA CERVICAL CYTOLOGY 9/1/2019 DTaP/Tdap/Td series (3 - Td) 10/3/2025 Alergias  Review Complete El: 10/10/2017 Por: MD Emeterio Parsons del:  10/10/2017 Intensidad Anotado Tipo de reacción [employer] Latex  10/10/2017   Not Verified Rash  
 Sulfa (Sulfonamide Antibiotics)  05/09/2010    Rash Vacunas actuales Revisadas el:  9/29/2010 Elbridge Saucer TD Vaccine 10/24/2009 TDAP Vaccine 9/29/2010 Td, Adsorbed PF 10/3/2015 No revisadas esta visita You Were Diagnosed With   
  
 Caretha Curry Insect bite, initial encounter    -  Primary ICD-10-CM: W57. Jimmye Rise ICD-9-CM: 919.4, E906.4 Reactive lymphadenopathy     ICD-10-CM: R59.9 ICD-9-CM: 328. 6 Partes vitales PS Pulso Temperatura Resp [place] ( percentil de crecimiento) Peso (percentil de crecimiento)  
 137/78 71 98.1 °F (36.7 °C) (Oral) 18 5' 2\" (1.575 m) 199 lb (90.3 kg) LMP (última amol) SpO2 BMI (IMC) Estado obstétrico Estatus de tabaquísmo 07/05/2011 99% 36.4 kg/m2 Postmenopausal Former Smoker Historial de signos vitales BMI and BSA Data Body Mass Index Body Surface Area  
 36.4 kg/m 2 1.99 m 2 [facility] Name Phone [address], [address] [phone] Justin lista de medicamentos actualizada Lista actualizada el: 10/10/17  3:28 PM.  Adela Santana use justin lista de medicamentos más reciente. busPIRone 5 mg tablet También conocido bill:  BUSPAR Take 1 Tab by mouth three (3) times daily as needed. doxycycline 100 mg tablet También conocido bill:  ADOXA Take 1 Tab by mouth two (2) times a day for 10 days. MULTIVITAMIN PO Take  by mouth. Recetas Enviado a la [place] Refills  
 doxycycline (ADOXA) 100 mg tablet 0 Sig: Take 1 Tab by mouth two (2) times a day for 10 days. Class: Normal  
 Pharmacy: [facility] Ayaz Bravod Ph #: [phone] Route: Oral  
  
Instrucciones para el Paciente Body mass index is 36.4 kg/(m^2). Focus on regular exercise (150 minutes each week) and healthy eating. Eat more fruits and vegetables. Eat more protein (egg whites, beans, and nuts you know you tolerate) and less carbohydrates (white bread, white rice, white pasta, white potatoes, sodas, and sweets). Eat appropriately small portion sizes. Wash wound with soapy water, apply neosporin at bedtime Take Doxycycline with food and a big glass of water. Avoid the sun while on Doxycycline (either cover up or wear SPF 15 or above sun block) Follow up in 2 weeks if symptoms persist 
 
 
 
 
 
 
  
Introducing [facility] HE[facility] SERVICES! [facility] introduce portal paciente MyChart . Ahora se puede acceder a partes de reynoso expediente médico, enviar por correo electrónico la oficina de reynoso médico y solicitar renovaciones de medicamentos en línea. En reynoso navegador de Internet , Colletta Lipoma a [email or web address] Ynes clic en el usuario por Jesi Kinsey? Trude Patches clic aquí en la sesión Jordis Davenport. Verá la página de registro [place]. Ingrese reynoso código de [employer] Maryana ranjana y bill aparece a continuación. Usted no tendrá que [employer] código después de lakesha completado el proceso de registro . Si usted no se inscribe antes de la fecha de caducidad , debe solicitar un nuevo código. · MyChart Código de acceso : [id number] Expires: 1/8/2018  3:28 PM 
 
Ingresa los últimos cuatro dígitos de reynoso Número de Seguro Social ( xxxx ) y fecha de nacimiento ( dd / mm / aaaa ) bill se indica y ynes clic en Enviar. Usted será llevado a la siguiente página de registro . Crear un ID MyChart . Esta será reynoso ID de inicio de sesión de MyChart y no puede ser [place] , por lo que pensar en marion que es [place] y fácil de recordar . Crear marion contraseña MyChart . Usted puede cambiar reynoso contraseña en cualquier momento . Ingrese reynoso Password Reset de preguntas y [place] . [place] se puede utilizar en un momento posterior si usted olvida reynoso contraseña. Introduzca reynoso dirección de correo electrónico . Gillian Mercado recibirá marion notificación por correo electrónico cuando la nueva información está disponible en MyChart . Omega Pillar clic en Registrarse. Di Mings tyra y descargar porciones de reynoso expediente médico. 
Ynes clic en el enlace de descarga del menú Resumen para descargar marion copia portátil de reynoso información médica . Si tiene [employer] , por favor visite la sección de preguntas frecuentes del sitio web MyChart . Recuerde, MyChart NO es que se utilizará para las necesidades urgentes. Para emergencias médicas , llame al 911 . Ahora disponible en reynoso iPhone y Android ! Por favor proporcione konstantin resumen de la documentación de cuidado a reynoso próximo proveedor. Your primary care clinician is listed as Jaqueline Macario. If you have any questions after today's visit, please call [phone].

## 2017-10-10 NOTE — PATIENT INSTRUCTIONS
Body mass index is 36.4 kg/(m^2). Focus on regular exercise (150 minutes each week) and healthy eating. Eat more fruits and vegetables. Eat more protein (egg whites, beans, and nuts you know you tolerate) and less carbohydrates (white bread, white rice, white pasta, white potatoes, sodas, and sweets). Eat appropriately small portion sizes. Wash wound with soapy water, apply neosporin at bedtime    Take Doxycycline with food and a big glass of water.   Avoid the sun while on Doxycycline (either cover up or wear SPF 15 or above sun block)      Follow up in 2 weeks if symptoms persist

## 2017-10-10 NOTE — PROGRESS NOTES
Amanuel Lao is a 62 y.o. female      Issues discussed today include:        Signs and symptoms:  Sore on her scalp  Duration:  One week  Context:  Now with reactive lymphadenitis  Location:  Left posterior scalp  Quality:  Looks like insect bite but she is not aware of tick or spider bite but she has been building a cabin  Severity:  No systemic sx  Timing:  One week  Modifying factors:  Rx doxy. Tdap UTD    Data reviewed or ordered today:  consdier further eval (labs and possible CXR to assess mediastinal nodes) if needed    Other problems include:  Patient Active Problem List   Diagnosis Code    TMJ (dislocation of temporomandibular joint) S03. 00XA    Abnormal antinuclear antibody titer R76.8    DDD (degenerative disc disease) [id number]    Abnormal Pap smear [id number]    Unspecified vitamin D deficiency E55.9    FH: diabetes mellitus Z83.3    History of normal resting EKG [id number]    Macrocytosis without anemia D75.89    Lipid disorder E78.9    Chest pain, unspecified R07.9    HTN (hypertension) I10    Dizzy R42    Acute diverticulitis K57.92    Obesity (BMI 35.0-39.9 without comorbidity) E66.9    Elevated LDL cholesterol level E78.00       Medications:  Current Outpatient Prescriptions   Medication Sig Dispense Refill    MULTIVITAMIN PO Take  by mouth.  doxycycline (ADOXA) 100 mg tablet Take 1 Tab by mouth two (2) times a day for 10 days. 20 Tab 0    busPIRone (BUSPAR) 5 mg tablet Take 1 Tab by mouth three (3) times daily as needed. 30 Tab 1       Allergies: Allergies   Allergen Reactions    Latex Rash    Sulfa (Sulfonamide Antibiotics) Rash       LMP:  Patient's last menstrual period was 07/05/2011. Social History     Social History    Marital status:      Spouse name: N/A    Number of children: N/A    Years of education: N/A     Occupational History    Not on file.      Social History Main Topics    Smoking status: Former Smoker     Packs/day: 0.25    Smokeless tobacco: Never Used      Comment: quit in 2016 (started in late in 45s)    Alcohol use No      Comment: ocassional    Drug use: No    Sexual activity: Yes     Partners: Male     Other Topics Concern    Not on file     Social History Narrative         Family History   Problem Relation Age of Onset    Heart Disease Father          Meaningful use:  done      ROS:  Headaches:  no  Chest Pain:  no  SOB:  no  Fevers:  no  Falls:  no  anxiety/depression/losing interest in doing things that were previously enjoyed:  no. PHQ2 = 0  Other significant ROS:  No chills/weight changes    Patient's last menstrual period was 07/05/2011. Physical Exam  Visit Vitals    /78    Pulse 71    Temp 98.1 °F (36.7 °C) (Oral)    Resp 18    Ht 5' 2\" (1.575 m)    Wt 199 lb (90.3 kg)    LMP 07/05/2011    SpO2 99%    BMI 36.4 kg/m2     BP Readings from Last 3 Encounters:   10/10/17 137/78   03/16/17 (P) 143/79   02/12/17 138/71     Constitutional:  Appears well,  No Acute Distress, Vitals noted  Psychiatric:   Affect normal, Alert and cooperative, Oriented to person/place/time    Neck:   general inspection and Thyroid normal.  No abnormal cervical or supraclavicular nodes but left posterior chain seems reactive    Skin:  Dime sized indurated eschar with central necrosis, could be spider bite left posterior scalp          Assessment:    Patient Active Problem List   Diagnosis Code    TMJ (dislocation of temporomandibular joint) S03. 00XA    Abnormal antinuclear antibody titer R76.8    DDD (degenerative disc disease) [id number]    Abnormal Pap smear [id number]    Unspecified vitamin D deficiency E55.9    FH: diabetes mellitus Z83.3    History of normal resting EKG [id number]    Macrocytosis without anemia D75.89    Lipid disorder E78.9    Chest pain, unspecified R07.9    HTN (hypertension) I10    Dizzy R42    Acute diverticulitis K57.92    Obesity (BMI 35.0-39.9 without comorbidity) E66.9    Elevated LDL cholesterol level E78.00 Today's diagnoses are:    ICD-10-CM ICD-9-CM    1. Insect bite, initial encounter W57. XXXA 919.4 doxycycline (ADOXA) 100 mg tablet     E906.4    2. Reactive lymphadenopathy R59.9 785.6 doxycycline (ADOXA) 100 mg tablet       Plan:  Orders Placed This Encounter    MULTIVITAMIN PO     Sig: Take  by mouth.  doxycycline (ADOXA) 100 mg tablet     Sig: Take 1 Tab by mouth two (2) times a day for 10 days. Dispense:  20 Tab     Refill:  0     Take with food       See patient instructions  Patient Instructions   Body mass index is 36.4 kg/(m^2). Focus on regular exercise (150 minutes each week) and healthy eating. Eat more fruits and vegetables. Eat more protein (egg whites, beans, and nuts you know you tolerate) and less carbohydrates (white bread, white rice, white pasta, white potatoes, sodas, and sweets). Eat appropriately small portion sizes. Wash wound with soapy water, apply neosporin at bedtime    Take Doxycycline with food and a big glass of water.   Avoid the sun while on Doxycycline (either cover up or wear SPF 15 or above sun block)      Follow up in 2 weeks if symptoms persist                refresh note:  done    AVS Printed:  done

## 2017-10-25 ENCOUNTER — TELEPHONE (OUTPATIENT)
Dept: FAMILY MEDICINE CLINIC | Age: 57
End: 2017-10-25

## 2017-10-25 DIAGNOSIS — R59.1 LYMPHADENOPATHY: Primary | ICD-10-CM

## 2017-10-25 DIAGNOSIS — L98.9 SKIN LESION: ICD-10-CM

## 2017-10-25 NOTE — TELEPHONE ENCOUNTER
Have patient see Dr. Thompson Code #[id number] for skin lesion on the scalp. I also suggest a chest xray to look at lymph nodes (in our office)    Please call Melyssa Bonner to help arrange and authorize any tests and/or referrals.   Her # is [phone]

## 2017-10-25 NOTE — TELEPHONE ENCOUNTER
Patient calling advising the bump on her head is still there and has not changed even after taking all of the antibiotics that Dr Roya Cabral prescribed to her a couple weeks ago for it, She wants to speak to Dr Dena Herrera Nurse regarding this issue, thank you.     [phone]

## 2017-12-19 ENCOUNTER — HOSPITAL ENCOUNTER (OUTPATIENT)
Dept: LAB | Age: 57
Discharge: HOME | End: 2017-12-19

## 2018-02-05 ENCOUNTER — OFFICE VISIT (OUTPATIENT)
Dept: FAMILY MEDICINE CLINIC | Age: 58
End: 2018-02-05

## 2018-02-05 VITALS
TEMPERATURE: 98.4 F | BODY MASS INDEX: 33.86 KG/M2 | OXYGEN SATURATION: 97 % | HEART RATE: 83 BPM | SYSTOLIC BLOOD PRESSURE: 136 MMHG | WEIGHT: 184 LBS | DIASTOLIC BLOOD PRESSURE: 82 MMHG | RESPIRATION RATE: 16 BRPM | HEIGHT: 62 IN

## 2018-02-05 DIAGNOSIS — G93.31 POST-INFLUENZA SYNDROME: Primary | ICD-10-CM

## 2018-02-05 DIAGNOSIS — R05.9 COUGH: ICD-10-CM

## 2018-02-05 DIAGNOSIS — J18.9 PNEUMONITIS: ICD-10-CM

## 2018-02-05 PROBLEM — G47.33 OSA ON CPAP: Status: ACTIVE | Noted: 2018-02-05

## 2018-02-05 PROBLEM — Z99.89 OSA ON CPAP: Status: ACTIVE | Noted: 2018-02-05

## 2018-02-05 RX ORDER — BENZONATATE 200 MG/1
200 CAPSULE ORAL
Qty: 21 CAP | Refills: 0 | Status: SHIPPED | OUTPATIENT
Start: 2018-02-05 | End: 2018-02-12

## 2018-02-05 RX ORDER — MOXIFLOXACIN HYDROCHLORIDE 400 MG/1
400 TABLET ORAL DAILY
Qty: 10 TAB | Refills: 0 | Status: SHIPPED | OUTPATIENT
Start: 2018-02-05 | End: 2018-02-06 | Stop reason: ALTCHOICE

## 2018-02-05 NOTE — PROGRESS NOTES
Miriam Oliver is a 62 y.o. female      Issues discussed today include:      Signs and symptoms:  Cough producing yellow sputum  Duration: 1-2 week  Context:  +exposures  Location:  Head and chest  Quality:  congestion  Severity:  Preventing rest  Timing:  now  Modifying factors:  + fevers. Sounds like she had influenza and now has super infection      Data reviewed or ordered today:  CXR    Other problems include:  Patient Active Problem List   Diagnosis Code    TMJ (dislocation of temporomandibular joint) S03. 00XA    DDD (degenerative disc disease) [id number]    Abnormal Pap smear [id number]    Unspecified vitamin D deficiency E55.9    FH: diabetes mellitus Z83.3    History of normal resting EKG [id number]    Macrocytosis without anemia D75.89    Lipid disorder E78.9    Chest pain, unspecified R07.9    HTN (hypertension) I10    Acute diverticulitis K57.92    Obesity (BMI 35.0-39.9 without comorbidity) E66.9    Elevated LDL cholesterol level E78.00    GUILLERMO on CPAP G47.33, Z99.89       Medications:  Current Outpatient Prescriptions   Medication Sig Dispense Refill    moxifloxacin (AVELOX) 400 mg tablet Take 1 Tab by mouth daily. 10 Tab 0    benzonatate (TESSALON) 200 mg capsule Take 1 Cap by mouth three (3) times daily as needed for Cough for up to 7 days. 21 Cap 0    MULTIVITAMIN PO Take  by mouth.  busPIRone (BUSPAR) 5 mg tablet Take 1 Tab by mouth three (3) times daily as needed. 30 Tab 1       Allergies: Allergies   Allergen Reactions    Latex Rash    Sulfa (Sulfonamide Antibiotics) Rash       LMP:  Patient's last menstrual period was 07/05/2011. Social History     Social History    Marital status:      Spouse name: N/A    Number of children: N/A    Years of education: N/A     Occupational History    Not on file.      Social History Main Topics    Smoking status: Former Smoker     Packs/day: 0.25    Smokeless tobacco: Never Used      Comment: quit in 2016 (started in late in 45s)  Alcohol use No      Comment: ocassional    Drug use: No    Sexual activity: Yes     Partners: Male     Other Topics Concern    Not on file     Social History Narrative         Family History   Problem Relation Age of Onset    Heart Disease Father          Meaningful use:  done      ROS:  Headaches:  no  Chest Pain:  no  SOB:  no  Fevers:  yes    Other significant ROS:      Patient's last menstrual period was 07/05/2011. Physical Exam  Visit Vitals    /82 (BP 1 Location: Left arm, BP Patient Position: Sitting)    Pulse 83    Temp 98.4 °F (36.9 °C) (Oral)    Resp 16    Ht 5' 2\" (1.575 m)    Wt 184 lb (83.5 kg)    LMP 07/05/2011    SpO2 97%    BMI 33.65 kg/m2     BP Readings from Last 3 Encounters:   02/05/18 136/82   10/10/17 137/78   03/16/17 (P) 143/79     Constitutional:  Appears well,  No Acute Distress, Vitals noted  Psychiatric:   Affect normal, Alert and cooperative, Oriented to person/place/time    Eyes:   Pupils equally round and reactive, EOMI, conjunctiva clear, eyelids normal  ENT:   External ears and nose normal/lips, teeth=OK/gums normal, TMs and Orophyarynx normal  Neck:   general inspection and Thyroid normal.  No abnormal cervical or supraclavicular nodes    Lungs:   Some rhonchi to auscultation, good respiratory effort  Heart: Ausculation normal.  Regular rhythm. No cardiac murmurs. No carotid bruits or palpable thrills  Chest wall normal  Abd:  benign  Extremities:   without edema, good peripheral pulses  Skin:   Warm to palpation, without rashes, bruising, or suspicious lesions           Assessment:    Patient Active Problem List   Diagnosis Code    TMJ (dislocation of temporomandibular joint) S03. 00XA    DDD (degenerative disc disease) [id number]    Abnormal Pap smear [id number]    Unspecified vitamin D deficiency E55.9    FH: diabetes mellitus Z83.3    History of normal resting EKG [id number]    Macrocytosis without anemia D75.89    Lipid disorder E78.9    Chest pain, unspecified R07.9    HTN (hypertension) I10    Acute diverticulitis K57.92    Obesity (BMI 35.0-39.9 without comorbidity) E66.9    Elevated LDL cholesterol level E78.00    GUILLERMO on CPAP G47.33, Z99.89       Today's diagnoses are:    ICD-10-CM ICD-9-CM    1. 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 Cough R05 786.2 benzonatate (TESSALON) 200 mg capsule   3. Pneumonitis J18.9 486 moxifloxacin (AVELOX) 400 mg tablet       Plan:  Orders Placed This Encounter    XR CHEST PA LAT     Standing Status:   Future     Standing Expiration Date:   3/5/2019     Order Specific Question:   Reason for Exam     Answer:   cough    moxifloxacin (AVELOX) 400 mg tablet     Sig: Take 1 Tab by mouth daily. Dispense:  10 Tab     Refill:  0    benzonatate (TESSALON) 200 mg capsule     Sig: Take 1 Cap by mouth three (3) times daily as needed for Cough for up to 7 days.      Dispense:  21 Cap     Refill:  0       See patient instructions  Patient Instructions   Obtain chest xray    If worse go to ER    Gargle with warm salt water    Take:   over the counter tylenol as directed for pain or fever    Use OTC plain Mucinex for cough                    refresh note:  done    AVS Printed:  done

## 2018-02-05 NOTE — LETTER
2/5/2018 5:47 PM 
 
Ms. Netta Jones [address] [facility] [address] To Whom It May Concern, Please excuse from work today and tomorrow due to illness. Sincerely, Anabela Kim MD

## 2018-02-05 NOTE — PATIENT INSTRUCTIONS
Obtain chest xray    If worse go to ER    Gargle with warm salt water    Take:   over the counter tylenol as directed for pain or fever    Use OTC plain Mucinex for cough

## 2018-02-05 NOTE — MR AVS SNAPSHOT
[address] [id number] Patient: Rica Miller MRN: [id number] KRISTINA:[birth year] Visit Information Anastasia Quezada y Cristian Personal Médico Departamento Teléfono del Dep. Número de visita 2/5/2018  5:30 PM Raza Sandoval MD [address] [place], [place] [address] [phone] Upcoming Health Maintenance Date Due FOBT Q 1 YEAR AGE 50-75 2/12/2018 BREAST CANCER SCRN MAMMOGRAM 9/1/2018 PAP AKA CERVICAL CYTOLOGY 9/1/2019 DTaP/Tdap/Td series (3 - Td) 10/3/2025 Alergias  Review Complete El: 2/5/2018 Por: Chema Maza A partir del:  2/5/2018 Intensidad Anotado Tipo de reacción [employer] [facility] [employer] Latex  10/10/2017   Not Verified Rash  
 Sulfa (Sulfonamide Antibiotics)  05/09/2010    Rash Vacunas actuales Revisadas el:  9/29/2010 Gelene Palma TD Vaccine 10/24/2009 TDAP Vaccine 9/29/2010 Td, Adsorbed PF 10/3/2015 No revisadas esta visita You Were Diagnosed With   
  
 Syeda Seth Post-influenza syndrome    -  Primary ICD-10-CM: G93.3 ICD-9-CM: 780.79 Cough     ICD-10-CM: R05 ICD-9-CM: 786.2 Pneumonitis     ICD-10-CM: J18.9 ICD-9-CM: 062 Partes vitales PS Pulso Temperatura Resp [place] ( percentil de crecimiento) Peso (percentil de crecimiento) 136/82 (BP 1 Location: Left arm, BP Patient Position: Sitting) 83 98.4 °F (36.9 °C) (Oral) 16 5' 2\" (1.575 m) 184 lb (83.5 kg) LMP (última maol) SpO2 BMI (IMC) Estado obstétrico Estatus de tabaquísmo 07/05/2011 97% 33.65 kg/m2 Postmenopausal Former Smoker BMI and BSA Data Body Mass Index Body Surface Area  
 33.65 kg/m 2 1.91 m 2 [facility] Name Phone [id number] [address] [address] [facility] [phone] Justin lista de medicamentos actualizada Lista actualizada el: 2/5/18  5:48 PM.  Tamra Qureshi use justin lista de medicamentos más reciente. benzonatate 200 mg capsule También conocido bill:  TESSALON Take 1 Cap by mouth three (3) times daily as needed for Cough for up to 7 days. busPIRone 5 mg tablet También conocido bill:  BUSPAR Take 1 Tab by mouth three (3) times daily as needed. moxifloxacin 400 mg tablet También conocido bill:  [place] Take 1 Tab by mouth daily. MULTIVITAMIN PO Take  by mouth. Recetas Enviado a la Crisp Refills  
 moxifloxacin (AVELOX) 400 mg tablet 0 Sig: Take 1 Tab by mouth daily. Class: Normal  
 Pharmacy: [address] [place] [address] #: [phone] Route: Oral  
 benzonatate (TESSALON) 200 mg capsule 0 Sig: Take 1 Cap by mouth three (3) times daily as needed for Cough for up to 7 days. Class: Normal  
 Pharmacy: [address] [place] [address] Ph #: [phone] Route: Oral  
  
Por hacer 02/05/2018 Imaging:  XR CHEST PA LAT Instrucciones para el Paciente Obtain chest xray If worse go to ER Gargle with warm salt water Take:   over the counter tylenol as directed for pain or fever Use OTC plain Mucinex for cough Introducing [facility]! [facility] introduce portal paciente MyChart . Ahora se puede acceder a partes de reynoso expediente médico, enviar por correo electrónico la oficina de reynoso médico y solicitar renovaciones de medicamentos en línea. En reynoso navegador de Internet , Dinora Mcknight a [email or web address] Ynes slava en el usuario por Jagjit Hutchins? Maryanna Heimlich clic aquí en la sesión Joey Whitaker. Verá la página de registro [place]. Ingrese reynoso código de Bank  Maryana ranjana y bill aparece a continuación. Usted no tendrá que [employer] código después de lakesha completado el proceso de registro .  Si usted no se inscribe antes de la fecha de caducidad , debe solicitar un nuevo código. · MyChart Código de acceso : [id number] Expires: 5/6/2018  5:36 PM 
 
Ingresa los últimos cuatro dígitos de reynoso Número de Seguro Social ( xxxx ) y fecha de nacimiento ( dd / mm / aaaa ) bill se indica y ynes clic en Enviar. Usted será llevado a la siguiente página de registro . Crear un ID MyChart . Esta será reynoso ID de inicio de sesión de MyChart y no puede ser [place] , por lo que pensar en marion que es Marisue Daubs y fácil de recordar . Crear marion contraseña MyChart . Usted puede cambiar reynoso contraseña en cualquier momento . Ingrese reynoso Password Reset de preguntas y Burr . [place] se puede utilizar en un momento posterior si usted olvida reynoso contraseña. Introduzca reynoso dirección de correo electrónico . Ivette Red recibirá marion notificación por correo electrónico cuando la nueva información está disponible en MyChart . Leonidas Brilliant clic en Registrarse. Meryle Mcduffie tyra y descargar porciones de reynoso expediente médico. 
Ynes clic en el enlace de descarga del menú Resumen para descargar marion copia portátil de reynoso información médica . Si tiene [employer] , por favor visite la sección de preguntas frecuentes del sitio web MyChart . Recuerde, MyChart NO es que se utilizará para las necesidades urgentes. Para emergencias médicas , llame al 911 . Ahora disponible en reynoso iPhone y Android ! Por favor proporcione konstantin resumen de la documentación de cuidado a reynoso próximo proveedor. Your primary care clinician is listed as Pipe Mahmood. If you have any questions after today's visit, please call [phone].

## 2018-02-06 ENCOUNTER — TELEPHONE (OUTPATIENT)
Dept: FAMILY MEDICINE CLINIC | Age: 58
End: 2018-02-06

## 2018-02-06 DIAGNOSIS — J40 BRONCHITIS: Primary | ICD-10-CM

## 2018-02-06 RX ORDER — DOXYCYCLINE 100 MG/1
100 TABLET ORAL 2 TIMES DAILY
Qty: 20 TAB | Refills: 0 | Status: SHIPPED | OUTPATIENT
Start: 2018-02-06 | End: 2018-02-06 | Stop reason: ALTCHOICE

## 2018-02-06 RX ORDER — AZITHROMYCIN 250 MG/1
TABLET, FILM COATED ORAL
Qty: 6 TAB | Refills: 0 | Status: SHIPPED | OUTPATIENT
Start: 2018-02-06 | End: 2018-02-11

## 2018-02-06 NOTE — TELEPHONE ENCOUNTER
----- Message from Mary Olivarez sent at 2/6/2018  4:17 PM EST -----  Regarding: /Telephone  Pt is requesting a callback in reference to Rx that was discuss earlier with the nurse.    Best [phone]

## 2018-02-06 NOTE — TELEPHONE ENCOUNTER
Spoke with Patient and she looked up the side effects of the Avelox and does not want to take this medication, the label on the bottle stated may cause suicidal thoughts could occur. Patient is wanting to know if you can prescribe something else. She does have the cough and chest congestion.

## 2018-02-06 NOTE — TELEPHONE ENCOUNTER
Patient wants to know if a different antibiotic can be sent into pharmacy due to she does not want to take the one prescription due to the side effects. Please call regarding this issue.  Thank you

## 2018-02-06 NOTE — TELEPHONE ENCOUNTER
I called the patient back, and informed her that the physician has sent the zithromax over to her pharmacy. She is appreciative that I let her know.

## 2018-02-06 NOTE — TELEPHONE ENCOUNTER
Called and left message for the patient, needed to get alittle bit more information. Did the pt start the abx and then had side effects or is just not taking it all.

## 2018-02-06 NOTE — TELEPHONE ENCOUNTER
Spoke with the patient to let her know doxycycline was sent it, Patient then stated she took that a few months ago for a lesion in her head and caused discoloration of her skin when she went outside. Is asking is there something else. Sorry.

## 2018-02-18 ENCOUNTER — OFFICE VISIT (OUTPATIENT)
Dept: FAMILY MEDICINE CLINIC | Age: 58
End: 2018-02-18

## 2018-02-18 VITALS
HEIGHT: 62 IN | DIASTOLIC BLOOD PRESSURE: 79 MMHG | TEMPERATURE: 98 F | RESPIRATION RATE: 16 BRPM | HEART RATE: 76 BPM | WEIGHT: 188.8 LBS | OXYGEN SATURATION: 99 % | SYSTOLIC BLOOD PRESSURE: 128 MMHG | BODY MASS INDEX: 34.74 KG/M2

## 2018-02-18 DIAGNOSIS — R05.9 COUGH: ICD-10-CM

## 2018-02-18 DIAGNOSIS — Z12.11 COLON CANCER SCREENING: ICD-10-CM

## 2018-02-18 DIAGNOSIS — R10.31 RLQ ABDOMINAL PAIN: Primary | ICD-10-CM

## 2018-02-18 NOTE — PROGRESS NOTES
Harriet Estes is a 62 y.o. female      Issues discussed today include:        Signs and symptoms:  RLQ pain  Duration:  One week  Context:  Comes and goes  Location:  RLQ  Quality:  Sharp   Severity:  Can be 10/10  Timing:  Not present now  Modifying factors:  No fevers  Rx:  Pelvic US    Data reviewed or ordered today:  Needs colonoscopy and FIT test    Other problems include:  Patient Active Problem List   Diagnosis Code    TMJ (dislocation of temporomandibular joint) S03. 00XA    DDD (degenerative disc disease) [id number]    Abnormal Pap smear [id number]    Unspecified vitamin D deficiency E55.9    FH: diabetes mellitus Z83.3    History of normal resting EKG GIQ2947    Macrocytosis without anemia D75.89    Lipid disorder E78.9    Chest pain, unspecified R07.9    HTN (hypertension) I10    Acute diverticulitis K57.92    Obesity (BMI 35.0-39.9 without comorbidity) E66.9    Elevated LDL cholesterol level E78.00    GUILLERMO on CPAP G47.33, Z99.89       Medications:  Current Outpatient Prescriptions   Medication Sig Dispense Refill    MULTIVITAMIN PO Take  by mouth. Allergies: Allergies   Allergen Reactions    Latex Rash    Doxycycline Other (comments)     Dark skin    Sulfa (Sulfonamide Antibiotics) Rash       LMP:  Patient's last menstrual period was 07/05/2011. Social History     Social History    Marital status:      Spouse name: N/A    Number of children: N/A    Years of education: N/A     Occupational History    Not on file.      Social History Main Topics    Smoking status: Former Smoker     Packs/day: 0.25    Smokeless tobacco: Never Used      Comment: quit in 2016 (started in late in 45s)    Alcohol use No      Comment: ocassional    Drug use: No    Sexual activity: Yes     Partners: Male     Other Topics Concern    Not on file     Social History Narrative         Family History   Problem Relation Age of Onset    Heart Disease Father          Meaningful use: done      ROS:  Headaches:  no  Chest Pain:  no  SOB:  no  Fevers:  no  Falls:  no  Other significant ROS:      Patient's last menstrual period was 07/05/2011. Physical Exam  Visit Vitals    /79 (BP 1 Location: Left arm, BP Patient Position: Sitting)    Pulse 76    Temp 98 °F (36.7 °C) (Oral)    Resp 16    Ht 5' 2\" (1.575 m)    Wt 188 lb 12.8 oz (85.6 kg)    LMP 07/05/2011    SpO2 99%    BMI 34.53 kg/m2     BP Readings from Last 3 Encounters:   02/18/18 128/79   02/05/18 136/82   10/10/17 137/78     Constitutional:  Appears well,  No Acute Distress, Vitals noted  Psychiatric:   Affect normal, Alert and cooperative, Oriented to person/place/time    Eyes:   Pupils equally round and reactive, EOMI, conjunctiva clear, eyelids normal  ENT:   External ears and nose normal/lips, teeth=OK/gums normal, TMs and Orophyarynx normal  Neck:   general inspection and Thyroid normal.  No abnormal cervical or supraclavicular nodes    Lungs:   clear to auscultation, good respiratory effort  Heart: Ausculation normal.  Regular rhythm. No cardiac murmurs. No carotid bruits or palpable thrills  Chest wall normal  Abdominal exam:   normal.  Liver and spleen normal.  No bruits/masses/no tenderness now    Extremities:   without edema, good peripheral pulses  Skin:   Warm to palpation, without rashes, bruising, or suspicious lesions           Assessment:    Patient Active Problem List   Diagnosis Code    TMJ (dislocation of temporomandibular joint) S03. 00XA    DDD (degenerative disc disease) RUR3458    Abnormal Pap smear XAR9457    Unspecified vitamin D deficiency E55.9    FH: diabetes mellitus Z83.3    History of normal resting EKG SHF5179    Macrocytosis without anemia D75.89    Lipid disorder E78.9    Chest pain, unspecified R07.9    HTN (hypertension) I10    Acute diverticulitis K57.92    Obesity (BMI 35.0-39.9 without comorbidity) E66.9    Elevated LDL cholesterol level E78.00    GUILLERMO on CPAP G47.33, Z99.89       Today's diagnoses are:    ICD-10-CM ICD-9-CM    1. RLQ abdominal pain R10.31 789.03 US PELV NON OBS   2. Cough R05 786.2     recent URI is improving   3. Colon cancer screening Z12.11 V76.51 OCCULT BLOOD, IMMUNOASSAY (FIT)      REFERRAL TO GASTROENTEROLOGY       Plan:  Orders Placed This Encounter    US PELV NON OBS     Call #[phone] to schedule     Standing Status:   Future     Standing Expiration Date:   3/18/2019    OCCULT BLOOD, IMMUNOASSAY (FIT)    REFERRAL TO GASTROENTEROLOGY     Referral Priority:   Routine     Referral Type:   Consultation     Referral Reason:   Specialty Services Required     Referral Location:   [facility]     Referred to Provider:   Justin Ugarte MD     Requested Specialty:   Gastroenterology       See patient instructions  There are no Patient Instructions on file for this visit.       refresh note:  done    AVS Printed:  done

## 2018-02-18 NOTE — PATIENT INSTRUCTIONS
obtain ultrasound     See Dr. Debra Manley for colonoscopy    Return your stool cards when ready    Please call Connie Avila to help arrange and authorize any tests and/or referrals.   Her # is [phone] at [facility] is #[phone]    If worse go to ER

## 2018-02-18 NOTE — PROGRESS NOTES
Chief Complaint   Patient presents with    Abdominal Pain     R sided pain since Monday. Pain isolated to right side near umbilical region. Pain subsides and then later, pt experiences pain again. 1. Have you been to the ER, urgent care clinic since your last visit? Hospitalized since your last visit? No    2. Have you seen or consulted any other health care providers outside of the [address] [place] [address] since your last visit? Include any pap smears or colon screening.  No

## 2018-02-18 NOTE — MR AVS SNAPSHOT
[address] [phone] Patient: Rissa Mccormick MRN: [id number] HAZ:[birth year] Visit Information Date & Time Provider Department Dept. Phone Encounter #  
 2/18/2018  2:30 PM Bibi Hernández MD [address] [facility] [phone] [id number] Upcoming Health Maintenance Date Due FOBT Q 1 YEAR AGE 50-75 2/12/2018 BREAST CANCER SCRN MAMMOGRAM 9/1/2018 PAP AKA CERVICAL CYTOLOGY 9/1/2019 DTaP/Tdap/Td series (3 - Td) 10/3/2025 Allergies as of 2/18/2018  Review Complete On: 2/18/2018 By: Savannah Anderson Severity Noted Reaction Type Reactions Latex  10/10/2017   Not Verified Rash Doxycycline  02/06/2018    Other (comments) Dark skin  
 Sulfa (Sulfonamide Antibiotics)  05/09/2010    Rash Current Immunizations  Reviewed on 9/29/2010 Name Date  
 TD Vaccine 10/24/2009 TDAP Vaccine 9/29/2010 Td, Adsorbed PF 10/3/2015 Not reviewed this visit You Were Diagnosed With   
  
 Codes Comments RLQ abdominal pain    -  Primary ICD-10-CM: R10.31 ICD-9-CM: 789.03 Cough     ICD-10-CM: R05 ICD-9-CM: 786.2 recent URI is improving Colon cancer screening     ICD-10-CM: Z12.11 ICD-9-CM: V76.51 Vitals BP Pulse Temp Resp Height(growth percentile) Weight(growth percentile) 128/79 (BP 1 Location: Left arm, BP Patient Position: Sitting) 76 98 °F (36.7 °C) (Oral) 16 5' 2\" (1.575 m) 188 lb 12.8 oz (85.6 kg) LMP SpO2 BMI OB Status Smoking Status 07/05/2011 99% 34.53 kg/m2 Postmenopausal Former Smoker Vitals History BMI and BSA Data Body Mass Index Body Surface Area 34.53 kg/m 2 1.94 m 2 Preferred Pharmacy Pharmacy Name Phone [id number] [address] [address] [phone] Your Updated Medication List  
  
   
This list is accurate as of: 2/18/18  2:49 PM.  Always use your most recent med list.  
  
  
  
  
 MULTIVITAMIN PO Take  by mouth. We Performed the Following OCCULT BLOOD, IMMUNOASSAY (FIT) [id number] CPT(R)] REFERRAL TO GASTROENTEROLOGY [XQW27 Custom] Comments:  
 colonoscopy To-Do List   
 02/18/2018 Imaging:  US PELV NON OBS Referral Information Referral ID Referred By Referred To  
  
 [id number] [place] [address] Phone: [phone] Fax: [phone] Visits Status Start Date End Date 1 New Request 2/18/18 2/18/19 If your referral has a status of pending review or denied, additional information will be sent to support the outcome of this decision. Patient Instructions   
obtain ultrasound See Dr. Santiago Milan for colonoscopy Return your stool cards when ready Please call Fran Hernandez to help arrange and authorize any tests and/or referrals. Her # is [phone] Central Scheduling at [facility] is #[phone] If worse go to [facility]! Pablo Valdes introduces [employer] patient portal. Now you can access parts of your medical record, email your doctor's office, and request medication refills online. 1. In your internet browser, go to [email or web address] 2. Click on the First Time User? Click Here link in the Sign In box. You will see the New Member Sign Up page. 3. Enter your [employer] Access Code exactly as it appears below. You will not need to use this code after youve completed the sign-up process. If you do not sign up before the expiration date, you must request a new code. · [employer] Access Code: [email or web address] Expires: 5/6/2018  5:36 PM 
 
4. Enter the last four digits of your Social Security Number (xxxx) and Date of Birth (mm/dd/yyyy) as indicated and click Submit. You will be taken to the next sign-up page. 5. Create a [employer] ID. This will be your [employer] login ID and cannot be changed, so think of one that is secure and easy to remember. 6. Create a [employer] password. You can change your password at any time. 7. Enter your Password Reset Question and Answer. This can be used at a later time if you forget your password. 8. Enter your e-mail address. You will receive e-mail notification when new information is available in [address] 9. Click Sign Up. You can now view and download portions of your medical record. 10. Click the Download Summary menu link to download a portable copy of your medical information. If you have questions, please visit the Frequently Asked Questions section of the [employer] website. Remember, [employer] is NOT to be used for urgent needs. For medical emergencies, dial 911. Now available from your iPhone and Android! Please provide this summary of care documentation to your next provider. Your primary care clinician is listed as Silvestre Hernandez. If you have any questions after today's visit, please call [phone].

## 2018-02-19 ENCOUNTER — TELEPHONE (OUTPATIENT)
Dept: FAMILY MEDICINE CLINIC | Age: 58
End: 2018-02-19

## 2018-02-19 NOTE — TELEPHONE ENCOUNTER
Spoke with patient today & she stated that when she make her appointment with the gastro specialist, she will me back with the date. ...

## 2018-02-19 NOTE — TELEPHONE ENCOUNTER
Patient called to speak with Andrea Devine per the referral order of Dr. Flores Escobedo to gastro. Then she said she thinks that she does not need a referral  but if she does, she will call the office.

## 2018-02-20 ENCOUNTER — HOSPITAL ENCOUNTER (OUTPATIENT)
Dept: ULTRASOUND IMAGING | Age: 58
Discharge: HOME | End: 2018-02-20
Payer: COMMERCIAL

## 2018-02-20 DIAGNOSIS — R10.31 RLQ ABDOMINAL PAIN: ICD-10-CM

## 2018-02-20 DIAGNOSIS — R10.31 ABDOMINAL PAIN, RIGHT LOWER QUADRANT: ICD-10-CM

## 2018-02-20 PROBLEM — D25.9 FIBROID UTERUS: Status: ACTIVE | Noted: 2018-02-20

## 2018-02-20 PROCEDURE — 76830 TRANSVAGINAL US NON-OB: CPT

## 2018-02-20 PROCEDURE — 76856 US EXAM PELVIC COMPLETE: CPT

## 2018-02-20 NOTE — LETTER
2/21/2018 8:41 AM 
 
[address]. Marianne [address] [facility] [phone] Dear Marianne Miguel: 
 
Please find your most recent results below. Resulted Orders [address] Narrative INDICATION: RLQ pain. Right lower quadrant pain / Call #[phone] to schedule. Additional history: Right lower quadrant pain x4 weeks. COMPARISON: CT of the abdomen and pelvis, 10/12/2015. [facility] [place] TECHNIQUE: 
Real-time sonography of the pelvis was performed using the urine filled bladder 
as an acoustic window. Multiple static images of the uterus and ovaries were 
obtained. Transvaginal sonography was performed with multiple static images of 
the uterus and ovaries obtained. . 
TRANS ABDOMINAL FINDINGS: 
The uterus measures 5 x 3.9 x 3.2 cm. Hypoechoic, heterogeneous lesion in the 
anterior uterine body measuring 1.8 x 2.4 x 1.3 cm. The ovaries are not 
confidently identified due to bowel gas. There is no visualized mass or fluid in 
the pelvic cul-de-sac. [facility] [place] TRANS VAGINAL FINDINGS:  
The endometrial stripe measures approximately 0.4 cm. The uterus measures 
approximately 5.4 x 4.4 x 3.3 cm. Hypoechoic, heterogeneous lesion in the 
anterior aspect of the uterine body measuring 1.8 x 2.4 x 1.3 cm. The right 
ovary measures 1.8 x 1.2 x 2.1 cm. The left ovary is not confidently identified, 
likely due to body habitus and bowel gas. There is no visualized fluid or mass 
in the pelvic cul-de-sac. . 
Additional history: No visible abnormality in the right lower quadrant. .  
 Impression IMPRESSION:   
1. Likely uterine fibroid in the anterior body of the uterus. 2. The left ovary is not confidently identified. RECOMMENDATIONS: 
 
You have fibroids in your uterus.  This could be the cause of your pain.  Your ovaries did not show any masses.   
 
If your symptoms persist, you may wish to see a GYN surgeon to consider hysterectomy.  If you want to pursue this I would suggest Dr. Pardeep Price. Please call Stephanie Howell to help arrange and authorize any tests and/or referrals. SERJIO POLLARD [facility] # is [phone] Sincerely, Gena Myers MD

## 2018-02-20 NOTE — PROGRESS NOTES
You have fibroids in your uterus. This could be the cause of your pain. Your ovaries did not show any masses. If your symptoms persist, you may wish to see a GYN surgeon to consider hysterectomy. If you want to pursue this I would suggest Dr. Stephanie Upton. Please call Loi Dent to help arrange and authorize any tests and/or referrals.   Her # is [phone]

## 2018-02-22 ENCOUNTER — TELEPHONE (OUTPATIENT)
Dept: FAMILY MEDICINE CLINIC | Age: 58
End: 2018-02-22

## 2018-02-28 LAB — HEMOCCULT STL QL IA: NEGATIVE

## 2018-04-18 ENCOUNTER — TELEPHONE (OUTPATIENT)
Dept: FAMILY MEDICINE CLINIC | Age: 58
End: 2018-04-18

## 2018-04-18 NOTE — TELEPHONE ENCOUNTER
Patient called and states that she completed a medical release with [place] to have ultrasound results sent to [place] [address] [facility] and her records were not sent. Patient states we should have these results also and be able to send to [place] [address] [facility]. Patient states she have appt today at 5:00 pm. I explained to patient that a medical release is needed as GYN office is outside of [facility] and we didn't refer her ) no referral order on file. Asked patient if Dr Joselin Mora referred her there and she stated no. Patient was very upset and didn't agree to what I was telling her and felt that if she is asking me to verbally send what's she's requesting then I should comply. I told patient that I wasn't trying to give her a hard time at all and I understood how she felt, but that I was following protocol and just trying to protect her and her privacy. I told her to contact [facility] as she completed release there. Patient became more upset and which I placed her on speaker phone so my peers could hear the conversation as I explained the process over and over. I told her that I have no management available at this time to consult with and that I can't make any decisions on the matter without their consult. I told patient I wasn't going to be able to send U/S and told her I'm sorry. I did tell her she could have [facility] send us a medical release request with her authorization and we could send records. Again she states she doesn't agree with our way. Patient thanked me and asked for my name and I provided ( juan carlos). She told me to have a nice day and I told her, to have a nice day also.

## 2018-05-16 ENCOUNTER — TELEPHONE (OUTPATIENT)
Dept: FAMILY MEDICINE CLINIC | Age: 58
End: 2018-05-16

## 2018-05-16 NOTE — TELEPHONE ENCOUNTER
This message is to inform you we have attempted to reach out to your patient several times and have not been able to make contact and the patient has not responded to our messages. It is up to you to determine if you or your nurse needs to attempt to contact the patient to discuss the importance of an appointment with the specialist.  If you speak with the patient please make them aware they need to contact the office with appointment information including the date, time, doctors first and last name and the phone number to that office as soon as an appointment is made. If you receive appointment information please add to this telephone encounter and include all of the previously discussed information and route this message back to me. Please do not place a new order! I can reopen and edit the existing order.   Gastro Order

## 2018-07-17 ENCOUNTER — OFFICE VISIT (OUTPATIENT)
Dept: FAMILY MEDICINE CLINIC | Age: 58
End: 2018-07-17

## 2018-07-17 VITALS
TEMPERATURE: 98.6 F | WEIGHT: 191 LBS | HEART RATE: 69 BPM | BODY MASS INDEX: 35.15 KG/M2 | OXYGEN SATURATION: 98 % | SYSTOLIC BLOOD PRESSURE: 130 MMHG | DIASTOLIC BLOOD PRESSURE: 75 MMHG | HEIGHT: 62 IN | RESPIRATION RATE: 20 BRPM

## 2018-07-17 DIAGNOSIS — I10 ESSENTIAL HYPERTENSION: ICD-10-CM

## 2018-07-17 DIAGNOSIS — Z87.891 FORMER SMOKER: ICD-10-CM

## 2018-07-17 DIAGNOSIS — Z13.31 SCREENING FOR DEPRESSION: ICD-10-CM

## 2018-07-17 DIAGNOSIS — Z83.3 FH: DIABETES MELLITUS: ICD-10-CM

## 2018-07-17 DIAGNOSIS — G47.33 OSA (OBSTRUCTIVE SLEEP APNEA): ICD-10-CM

## 2018-07-17 DIAGNOSIS — E78.00 ELEVATED LDL CHOLESTEROL LEVEL: ICD-10-CM

## 2018-07-17 DIAGNOSIS — Z99.89 OSA ON CPAP: ICD-10-CM

## 2018-07-17 DIAGNOSIS — M85.80 OSTEOPENIA, UNSPECIFIED LOCATION: ICD-10-CM

## 2018-07-17 DIAGNOSIS — Z12.11 COLON CANCER SCREENING: ICD-10-CM

## 2018-07-17 DIAGNOSIS — Z00.00 HEALTHCARE MAINTENANCE: Primary | ICD-10-CM

## 2018-07-17 DIAGNOSIS — G47.33 OSA ON CPAP: ICD-10-CM

## 2018-07-17 RX ORDER — DIAZEPAM 2 MG/1
TABLET ORAL
COMMUNITY
Start: 2018-05-14

## 2018-07-17 NOTE — PROGRESS NOTES
Chief Complaint   Patient presents with    Complete Physical     1. Have you been to the ER, urgent care clinic since your last visit? Hospitalized since your last visit? No    2. Have you seen or consulted any other health care providers outside of the [facility] since your last visit? Include any pap smears or colon screening. No     Reviewed record in preparation for visit and have obtained necessary documentation.
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
## 2018-07-17 NOTE — PATIENT INSTRUCTIONS
Obtain mammogram    Consider vaccine for shingles and PCV 13    Stay active    follow up with your specialists

## 2018-07-18 PROBLEM — R73.09 ELEVATED GLUCOSE: Status: ACTIVE | Noted: 2018-07-18

## 2018-07-18 PROBLEM — E78.00 ELEVATED LDL CHOLESTEROL LEVEL: Status: RESOLVED | Noted: 2017-02-14 | Resolved: 2018-07-18

## 2018-07-18 LAB
ALBUMIN SERPL-MCNC: 4.5 G/DL (ref 3.5–5.5)
ALBUMIN/GLOB SERPL: 1.8 {RATIO} (ref 1.2–2.2)
ALP SERPL-CCNC: 61 IU/L (ref 39–117)
ALT SERPL-CCNC: 16 IU/L (ref 0–32)
AST SERPL-CCNC: 23 IU/L (ref 0–40)
BILIRUB SERPL-MCNC: 0.6 MG/DL (ref 0–1.2)
BUN SERPL-MCNC: 16 MG/DL (ref 6–24)
BUN/CREAT SERPL: 17 (ref 9–23)
CALCIUM SERPL-MCNC: 9.1 MG/DL (ref 8.7–10.2)
CHLORIDE SERPL-SCNC: 104 MMOL/L (ref 96–106)
CHOLEST SERPL-MCNC: 251 MG/DL (ref 100–199)
CO2 SERPL-SCNC: 24 MMOL/L (ref 20–29)
CREAT SERPL-MCNC: 0.93 MG/DL (ref 0.57–1)
ERYTHROCYTE [DISTWIDTH] IN BLOOD BY AUTOMATED COUNT: 13.7 % (ref 12.3–15.4)
EST. AVERAGE GLUCOSE BLD GHB EST-MCNC: 105 MG/DL
GLOBULIN SER CALC-MCNC: 2.5 G/DL (ref 1.5–4.5)
GLUCOSE SERPL-MCNC: 105 MG/DL (ref 65–99)
HBA1C MFR BLD: 5.3 % (ref 4.8–5.6)
HCT VFR BLD AUTO: 41.3 % (ref 34–46.6)
HDLC SERPL-MCNC: 58 MG/DL
HGB BLD-MCNC: 13.8 G/DL (ref 11.1–15.9)
INTERPRETATION, 910389: NORMAL
LDLC SERPL CALC-MCNC: 159 MG/DL (ref 0–99)
MCH RBC QN AUTO: 32.1 PG (ref 26.6–33)
MCHC RBC AUTO-ENTMCNC: 33.4 G/DL (ref 31.5–35.7)
MCV RBC AUTO: 96 FL (ref 79–97)
PLATELET # BLD AUTO: 222 X10E3/UL (ref 150–379)
POTASSIUM SERPL-SCNC: 4.4 MMOL/L (ref 3.5–5.2)
PROT SERPL-MCNC: 7 G/DL (ref 6–8.5)
RBC # BLD AUTO: 4.3 X10E6/UL (ref 3.77–5.28)
SODIUM SERPL-SCNC: 144 MMOL/L (ref 134–144)
TRIGL SERPL-MCNC: 172 MG/DL (ref 0–149)
TSH SERPL DL<=0.005 MIU/L-ACNC: 1.39 UIU/ML (ref 0.45–4.5)
VLDLC SERPL CALC-MCNC: 34 MG/DL (ref 5–40)
WBC # BLD AUTO: 3.7 X10E3/UL (ref 3.4–10.8)

## 2018-08-01 ENCOUNTER — TELEPHONE (OUTPATIENT)
Dept: FAMILY MEDICINE CLINIC | Age: 58
End: 2018-08-01

## 2018-08-01 NOTE — TELEPHONE ENCOUNTER
Per call from patient,    appt is 9/4/18 with Dr. Harika Sosa    Patient is on waiting list for sooner appt if cancellation    Thanks           Specialty     Gastroenterology       Primary Contact Information      Phone Fax E-mail Address     [phone] Not available.  [facility] Ceasar Duron

## 2018-08-02 ENCOUNTER — OFFICE VISIT (OUTPATIENT)
Dept: FAMILY MEDICINE CLINIC | Age: 58
End: 2018-08-02

## 2018-08-02 VITALS
BODY MASS INDEX: 34.78 KG/M2 | RESPIRATION RATE: 16 BRPM | OXYGEN SATURATION: 97 % | WEIGHT: 189 LBS | DIASTOLIC BLOOD PRESSURE: 70 MMHG | SYSTOLIC BLOOD PRESSURE: 131 MMHG | HEART RATE: 74 BPM | HEIGHT: 62 IN | TEMPERATURE: 97 F

## 2018-08-02 DIAGNOSIS — K57.32 SIGMOID DIVERTICULITIS: Primary | ICD-10-CM

## 2018-08-02 PROBLEM — K76.89 HEPATIC CYST: Status: ACTIVE | Noted: 2018-08-02

## 2018-08-02 RX ORDER — CIPROFLOXACIN 500 MG/1
500 TABLET ORAL 2 TIMES DAILY
Qty: 14 TAB | Refills: 0 | Status: SHIPPED | OUTPATIENT
Start: 2018-08-02 | End: 2018-08-09

## 2018-08-02 RX ORDER — METRONIDAZOLE 500 MG/1
500 TABLET ORAL 3 TIMES DAILY
Qty: 21 TAB | Refills: 0 | Status: SHIPPED | OUTPATIENT
Start: 2018-08-02 | End: 2018-08-09

## 2018-08-02 NOTE — PROGRESS NOTES
Chief Complaint   Patient presents with    Abdominal Pain     hospital F/U     1. Have you been to the ER, urgent care clinic since your last visit? Hospitalized since your last visit? YesKaaren Learn for Diverticulosis 7/29/18    2. Have you seen or consulted any other health care providers outside of the [address] [place] [address] since your last visit? Include any pap smears or colon screening.  No

## 2018-08-02 NOTE — MR AVS SNAPSHOT
[address] 
[phone] Patient: Willy Martinez MRN: [id number] YLB:0/96/1221 Visit Information Date & Time Provider Department Dept. Phone Encounter #  
 8/2/2018  4:10 PM Shonda Armando, Milady Lorenzo [phone] [id number] Upcoming Health Maintenance Date Due Influenza Age 5 to Adult 8/1/2018 BREAST CANCER SCRN MAMMOGRAM 7/17/2019* FOBT Q 1 YEAR AGE 50-75 2/26/2019 PAP AKA CERVICAL CYTOLOGY 9/1/2019 DTaP/Tdap/Td series (3 - Td) 10/3/2025 *Topic was postponed. The date shown is not the original due date. Allergies as of 8/2/2018  Review Complete On: 8/2/2018 By: Jose Alfredo Nelson LPN Severity Noted Reaction Type Reactions Latex  10/10/2017   Not Verified Rash Codeine  08/02/2018    Other (comments) Doxycycline  02/06/2018    Other (comments) Dark skin  
 Sulfa (Sulfonamide Antibiotics)  05/09/2010    Rash Current Immunizations  Reviewed on 9/29/2010 Name Date  
 TD Vaccine 10/24/2009 TDAP Vaccine 9/29/2010 Td, Adsorbed PF 10/3/2015 Not reviewed this visit Vitals BP Pulse Temp Resp Height(growth percentile) Weight(growth percentile) 131/70 74 97 °F (36.1 °C) (Oral) 16 5' 2\" (1.575 m) 189 lb (85.7 kg) LMP SpO2 BMI OB Status Smoking Status 07/05/2011 97% 34.57 kg/m2 Postmenopausal Former Smoker Vitals History BMI and BSA Data Body Mass Index Body Surface Area 34.57 kg/m 2 1.94 m 2 Preferred Pharmacy Pharmacy Name Phone [facility] [address] [facility] [phone] Your Updated Medication List  
  
   
This list is accurate as of 8/2/18  4:47 PM.  Always use your most recent med list.  
  
  
  
  
 ciprofloxacin HCl 500 mg tablet Commonly known as:  CIPRO Take 1 Tab by mouth two (2) times a day for 7 days.  Indications: Diverticulitis of Gastrointestinal Tract  
  
 diazePAM 2 mg tablet Commonly known as:  VALIUM Takes 1/2 tab at bedtime  
  
 metroNIDAZOLE 500 mg tablet Commonly known as:  FLAGYL Take 1 Tab by mouth three (3) times daily for 7 days. MULTIVITAMIN PO Take  by mouth. Prescriptions Sent to Pharmacy Refills  
 ciprofloxacin HCl (CIPRO) 500 mg tablet 0 Sig: Take 1 Tab by mouth two (2) times a day for 7 days. Indications: Diverticulitis of Gastrointestinal Tract Class: Normal  
 Pharmacy: [address] [place] [address] #: [phone] Route: Oral  
 metroNIDAZOLE (FLAGYL) 500 mg tablet 0 Sig: Take 1 Tab by mouth three (3) times daily for 7 days. Class: Normal  
 Pharmacy: [address] [place] [address] #: [phone] Route: Oral  
  
Patient Instructions Learning About Diverticulosis and Diverticulitis What are diverticulosis and diverticulitis? In diverticulosis and diverticulitis, pouches called diverticula form in the wall of the large intestine, or colon. · In diverticulosis, the pouches do not cause any pain or other symptoms. · In diverticulitis, the pouches get inflamed or infected and cause symptoms. Doctors aren't sure what causes these pouches in the colon. But they think that a low-fiber diet may play a role. Without fiber to add bulk to the stool, the colon has to work harder than normal to push the stool forward. The pressure from this may cause pouches to form in weak spots along the colon. Some people with diverticulosis get diverticulitis. But experts don't know why this happens. What are the symptoms? · In diverticulosis, most people don't have symptoms. But pouches sometimes bleed. · In diverticulitis, symptoms may last from a few hours to a week or more. They include: ¨ Belly pain. This is usually in the lower left side.  It is sometimes worse when you move. This is the most common symptom. ¨ Fever and chills. ¨ Bloating and gas. ¨ Diarrhea or constipation. ¨ Nausea and sometimes vomiting. ¨ Not feeling like eating. How can you prevent these problems? You may be able to lower your chance of getting diverticulitis. You can do this by taking steps to prevent constipation. · Eat fruits, vegetables, beans, and whole grains every day. These foods are high in fiber. · Drink plenty of fluids (enough so that your urine is light yellow or clear like water). If you have kidney, heart, or liver disease and have to limit fluids, talk with your doctor before you increase the amount of fluids you drink. · Get at least 30 minutes of exercise on most days of the week. Walking is a good choice. You also may want to do other activities, such as running, swimming, cycling, or playing tennis or team sports. · Take a fiber supplement, such as Citrucel or Metamucil, every day if needed. Read and follow all instructions on the label. · Schedule time each day for a bowel movement. Having a daily routine may help. Take your time and do not strain when having a bowel movement. Some people avoid nuts, seeds, berries, and popcorn. They believe that these foods might get trapped in the diverticula and cause pain. But there is no proof that these foods cause diverticulitis or make it worse. How are these problems treated? · The best way to treat diverticulosis is to avoid constipation. (See the tips above.) · Treatment for diverticulitis includes antibiotics and often a change in your diet. You may need only liquids at first. Your doctor may suggest pain medicines for pain or belly cramps. In some cases, surgery may be needed. Follow-up care is a key part of your treatment and safety. Be sure to make and go to all appointments, and call your doctor if you are having problems.  It's also a good idea to know your test results and keep a list of the medicines you take. Where can you learn more? Go to http://samson-zia.info/. Enter R446 in the search box to learn more about \"Learning About Diverticulosis and Diverticulitis. \" Current as of: May 12, 2017 Content Version: 11.7 © 5300-7090 [employer]. Care instructions adapted under license by [employer] (which disclaims liability or warranty for this information). If you have questions about a medical condition or this instruction, always ask your healthcare professional. [address] any warranty or liability for your use of this information. Introducing \[facility]\"" & HEALTH SERVICES! Suze Kirk introduces [employer] patient portal. Now you can access parts of your medical record, email your doctor's office, and request medication refills online. 1. In your internet browser, go to [email or web address] 2. Click on the First Time User? Click Here link in the Sign In box. You will see the New Member Sign Up page. 3. Enter your [employer] Access Code exactly as it appears below. You will not need to use this code after youve completed the sign-up process. If you do not sign up before the expiration date, you must request a new code. · [employer] Access Code: [email or web address] Expires: 10/15/2018  9:20 AM 
 
4. Enter the last four digits of your Social Security Number (xxxx) and Date of Birth (mm/dd/yyyy) as indicated and click Submit. You will be taken to the next sign-up page. 5. Create a [employer] ID. This will be your [employer] login ID and cannot be changed, so think of one that is secure and easy to remember. 6. Create a [employer] password. You can change your password at any time. 7. Enter your Password Reset Question and Answer. This can be used at a later time if you forget your password. 8. Enter your e-mail address. You will receive e-mail notification when new information is available in [address] 9. Click Sign Up. You can now view and download portions of your medical record. 10. Click the Download Summary menu link to download a portable copy of your medical information. If you have questions, please visit the Frequently Asked Questions section of the Scandit website. Remember, Sc[employer]it is NOT to be used for urgent needs. For medical emergencies, dial 911. Now available from your iPhone and Android! Please provide this summary of care documentation to your next provider. Your primary care clinician is listed as Stu Maza. If you have any questions after today's visit, please call [phone].

## 2018-08-02 NOTE — PATIENT INSTRUCTIONS
Learning About Diverticulosis and Diverticulitis  What are diverticulosis and diverticulitis? In diverticulosis and diverticulitis, pouches called diverticula form in the wall of the large intestine, or colon. · In diverticulosis, the pouches do not cause any pain or other symptoms. · In diverticulitis, the pouches get inflamed or infected and cause symptoms. Doctors aren't sure what causes these pouches in the colon. But they think that a low-fiber diet may play a role. Without fiber to add bulk to the stool, the colon has to work harder than normal to push the stool forward. The pressure from this may cause pouches to form in weak spots along the colon. Some people with diverticulosis get diverticulitis. But experts don't know why this happens. What are the symptoms? · In diverticulosis, most people don't have symptoms. But pouches sometimes bleed. · In diverticulitis, symptoms may last from a few hours to a week or more. They include:  ¨ Belly pain. This is usually in the lower left side. It is sometimes worse when you move. This is the most common symptom. ¨ Fever and chills. ¨ Bloating and gas. ¨ Diarrhea or constipation. ¨ Nausea and sometimes vomiting. ¨ Not feeling like eating. How can you prevent these problems? You may be able to lower your chance of getting diverticulitis. You can do this by taking steps to prevent constipation. · Eat fruits, vegetables, beans, and whole grains every day. These foods are high in fiber. · Drink plenty of fluids (enough so that your urine is light yellow or clear like water). If you have kidney, heart, or liver disease and have to limit fluids, talk with your doctor before you increase the amount of fluids you drink. · Get at least 30 minutes of exercise on most days of the week. Walking is a good choice. You also may want to do other activities, such as running, swimming, cycling, or playing tennis or team sports.   · Take a fiber supplement, such as Citrucel or Metamucil, every day if needed. Read and follow all instructions on the label. · Schedule time each day for a bowel movement. Having a daily routine may help. Take your time and do not strain when having a bowel movement. Some people avoid nuts, seeds, berries, and popcorn. They believe that these foods might get trapped in the diverticula and cause pain. But there is no proof that these foods cause diverticulitis or make it worse. How are these problems treated? · The best way to treat diverticulosis is to avoid constipation. (See the tips above.)  · Treatment for diverticulitis includes antibiotics and often a change in your diet. You may need only liquids at first. Your doctor may suggest pain medicines for pain or belly cramps. In some cases, surgery may be needed. Follow-up care is a key part of your treatment and safety. Be sure to make and go to all appointments, and call your doctor if you are having problems. It's also a good idea to know your test results and keep a list of the medicines you take. Where can you learn more? Go to http://samson-zia.info/. Enter C459 in the search box to learn more about \"Learning About Diverticulosis and Diverticulitis. \"  Current as of: May 12, 2017  Content Version: 11.7  © 6374-3345 [employer]. Care instructions adapted under license by [employer] (which disclaims liability or warranty for this information). If you have questions about a medical condition or this instruction, always ask your healthcare professional. [place] [address] any warranty or liability for your use of this information.

## 2018-08-02 NOTE — PROGRESS NOTES
HPI     CC: hospital follow for abdominal pain     Gabi Jensen is a 62 y.o. female with hx of fibroid, who presents for hospital follow up. At [facility] for abdominal pain on 7/29. CT abdomen/ pelvis. Diagnosed with diverticulosis and small cyst on liver. Saturday 7/28 began to have acute intermittent bilateral lower abdominal pain 10/10, which felt like a spasm. Had some loose stool. Had pain with sitting and walking. That night with fever to 101. Sunday 7/29 went to [facility] ED; was afebrile there. CT abdomen-pelvis done; per pt, ED doctor told her that she had diverticulosis without diverticulitis and was not treated with antibiotics. Also seen to have small hepatic cyst in left lateral lobe. Was not treated with antibiotics. States that she still has RLQ abdominal pain, but that it has improved 2/10 today. Has not had any more fevers. Tolerating PO solids and liquids    Has fibroids on uterus. Was seen by OBGYN on 4/18/18; Dr. Randie Brunner at [facility] . Has been having intermittent spotting since December; on average once every 2 weeks. Last had spotting on Sunday. No unexpected weight loss. PMHx - Reviewed  Past Medical History:   Diagnosis Date    Abnormal antinuclear antibody titer 5/9/2010    Abnormal Pap smear 5/9/2010    Environmental allergies 5/9/2010    TMJ (dislocation of temporomandibular joint) 5/9/2010    Trauma        Meds - Reviewed  Current Outpatient Prescriptions   Medication Sig Dispense Refill    diazePAM (VALIUM) 2 mg tablet Takes 1/2 tab at bedtime      MULTIVITAMIN PO Take  by mouth.          Allergies - Reviewed  Allergies   Allergen Reactions    Latex Rash    Codeine Other (comments)    Doxycycline Other (comments)     Dark skin    Sulfa (Sulfonamide Antibiotics) Rash       Smoker - Reviewed  History   Smoking Status    Former Smoker    Packs/day: 0.25   Smokeless Tobacco    Never Used     Comment: quit in 2016 (started in late in 45s)       ETOH - Reviewed  History   Alcohol Use No     Comment: ocassional       FH - Reviewed  Family History   Problem Relation Age of Onset    Heart Disease Father        ROS:  Review of Systems   Constitutional: Negative. Negative for activity change, appetite change, chills, diaphoresis, fatigue, fever and unexpected weight change. Respiratory: Negative for cough, chest tightness and shortness of breath. Cardiovascular: Negative for chest pain. Gastrointestinal: Positive for abdominal pain. Negative for blood in stool, constipation, diarrhea, nausea and vomiting. Genitourinary: Negative for dysuria and hematuria. Musculoskeletal: Negative for arthralgias and myalgias. Neurological: Negative for dizziness, light-headedness and headaches. Physical Exam:  Visit Vitals    /70    Pulse 74    Temp 97 °F (36.1 °C) (Oral)    Resp 16    Ht 5' 2\" (1.575 m)    Wt 189 lb (85.7 kg)    LMP 07/05/2011    SpO2 97%    BMI 34.57 kg/m2       Wt Readings from Last 3 Encounters:   08/02/18 189 lb (85.7 kg)   07/17/18 191 lb (86.6 kg)   02/18/18 188 lb 12.8 oz (85.6 kg)     BP Readings from Last 3 Encounters:   08/02/18 131/70   07/17/18 130/75   02/18/18 128/79        Physical Exam   Constitutional: She is oriented to person, place, and time. She appears well-developed and well-nourished. No distress. HENT:   Head: Normocephalic and atraumatic. Mouth/Throat: Oropharynx is clear and moist.   Eyes: No scleral icterus. Cardiovascular: Normal rate and regular rhythm. Pulmonary/Chest: Effort normal and breath sounds normal. No respiratory distress. She has no wheezes. Abdominal: Soft. She exhibits no distension. There is tenderness (RLQ, improved). There is no rebound and no guarding. Neurological: She is alert and oriented to person, place, and time. She exhibits normal muscle tone. Coordination normal.   Skin: Skin is warm and dry. She is not diaphoretic.    Psychiatric: She has a normal mood and affect. Her behavior is normal.   Nursing note and vitals reviewed. Assessment     62 y.o. female with hx of diverticulosis and fibroids presents for hospital follow up for abdominal pain:  Patient Active Problem List   Diagnosis Code    TMJ (dislocation of temporomandibular joint) S03. 00XA    DDD (degenerative disc disease) [id number]    Abnormal Pap smear [id number]    Unspecified vitamin D deficiency E55.9    FH: diabetes mellitus Z83.3    History of normal resting EKG [id number]    Macrocytosis without anemia D75.89    Lipid disorder E78.9    Chest pain, unspecified R07.9    HTN (hypertension) I10    Acute diverticulitis K57.92    Obesity (BMI 35.0-39.9 without comorbidity) E66.9    Fibroid uterus D25.9    Osteopenia M85.80    GUILLERMO (obstructive sleep apnea) G47.33    Elevated glucose R73.09       Today's diagnoses are:    ICD-10-CM ICD-9-CM    1. Sigmoid diverticulitis K57.32 562.11               Plan     1. Sigmoid Diverticulitis - abdominal pain present, but improved; nondistended, no guarding or rebound. Afebrile. - called and discussed with radiology at [facility] regarding CT abdomen-pelvis. Confirmed that patient DID have sigmoid diverticulitis with edema and fat stranding, but without perforation or abscess. Although pain improving and pt afebrile, will treat with antibiotics as pain still present. WBC wnl in ED.   - Cipro 500 BID x 7 days   - Flagyl 500 mg TID x 7 days   - encouraged PO fluids  - provided information and discussed dietary recommendations. Recommended good fiber diet, including fruits, vegetables, beans, whole grains. Fiber supplement PRN for regular BMs  - if no improvement RTC on Monday. If symptoms worsen or becomes febrile, can go to ED   - records to be placed in scan file. Prior labs and imaging were reviewed. I have discussed the diagnosis with the patient and the intended plan as seen in the above orders.  The patient has received an after-visit summary and questions were answered concerning future plans. I have discussed medication side effects and warnings with the patient as well. Patient discussed with Dr. Jil Kawasaki, Attending Physician.     Savanna Gonsales MD, PGY3  Family Medicine Resident

## 2018-08-03 PROBLEM — R73.09 ELEVATED GLUCOSE: Status: RESOLVED | Noted: 2018-07-18 | Resolved: 2018-08-03

## 2019-09-03 ENCOUNTER — OFFICE VISIT (OUTPATIENT)
Dept: FAMILY MEDICINE CLINIC | Age: 59
End: 2019-09-03

## 2019-09-03 VITALS
WEIGHT: 194.6 LBS | SYSTOLIC BLOOD PRESSURE: 117 MMHG | RESPIRATION RATE: 18 BRPM | BODY MASS INDEX: 35.81 KG/M2 | HEART RATE: 72 BPM | TEMPERATURE: 98.2 F | HEIGHT: 62 IN | OXYGEN SATURATION: 96 % | DIASTOLIC BLOOD PRESSURE: 75 MMHG

## 2019-09-03 DIAGNOSIS — Z00.00 HEALTHCARE MAINTENANCE: Primary | ICD-10-CM

## 2019-09-03 NOTE — PROGRESS NOTES
Identified pt with two pt identifiers(name and ). Reviewed record in preparation for visit and have obtained necessary documentation. Chief Complaint   Patient presents with    Other     labs        Health Maintenance Due   Topic    Shingrix Vaccine Age 50> (1 of 2)    BREAST CANCER SCRN MAMMOGRAM     FOBT Q 1 YEAR AGE 54-65     Influenza Age 5 to Adult     PAP AKA CERVICAL CYTOLOGY        Visit Vitals  /75 (BP 1 Location: Left arm, BP Patient Position: Sitting)   Pulse 72   Temp 98.2 °F (36.8 °C) (Oral)   Resp 18   Ht 5' 2\" (1.575 m)   Wt 194 lb 9.6 oz (88.3 kg)   SpO2 96%   BMI 35.59 kg/m²         Coordination of Care Questionnaire:  :   1) Have you been to an emergency room, urgent care, or hospitalized since your last visit? If yes, where when, and reason for visit? no       2. Have seen or consulted any other health care provider since your last visit? If yes, where when, and reason for visit? NO      3) Do you have an Advanced Directive/ Living Will in place? NO  If no, would you like information NO    Patient is accompanied by self I have received verbal consent from Asa Blue to discuss any/all medical information while they are present in the room.

## 2019-09-03 NOTE — PROGRESS NOTES
61 yo female here for labs    Pt sees dentist for TMJ and Dr. Quinton Bills -- gyn onc -- for uterine spotting -- had total abd hysterectomy and BSO    Labs done today    I reviewed with the resident the medical history and the resident's findings on the physical examination. I discussed with the resident the patient's diagnosis and concur with the plan.

## 2019-09-03 NOTE — PROGRESS NOTES
Subjective   Dave Ellison is a 62 y.o. female who presents to clinic to discuss getting her annual labs per her insurance company. She has no acute concerns today. She has been seeing the following providers:   Dr. Deborah Camacho (Dental) - for TMJ, every few months. Prescribes Valium  [facility] - [facility] 3 months after hysterectomy in April 2019. Review of Systems   General/Constitutional:   No headache, fever, fatigue, weight loss or weight gain       Eyes:   No redness, pruritis, pain, visual changes, swelling, or discharge      Ears:    No pain, loss or changes in hearing     Neck:   No swelling, masses, stiffness, pain, or limited movement     Cardiac:    No chest pain      Respiratory:   No cough or shortness of breath     GI:   No nausea/vomiting, diarrhea, abdominal pain, bloody or dark stools       :   No dysuria or  hematuria    Neurological:   No loss of consciousness, dizziness, seizures, dysarthria, cognitive changes, memory changes,  problems with balance, or unilateral weakness     Skin: No rash     Allergies   Allergies   Allergen Reactions    Latex Rash    Codeine Other (comments)    Doxycycline Other (comments)     Dark skin    Sulfa (Sulfonamide Antibiotics) Rash     Medications  Current Outpatient Medications   Medication Sig    diazePAM (VALIUM) 2 mg tablet Takes 1/2 tab at bedtime    MULTIVITAMIN PO Take  by mouth. No current facility-administered medications for this visit.       Medical History  Past Medical History:   Diagnosis Date    Abnormal antinuclear antibody titer 5/9/2010    Abnormal Pap smear 5/9/2010    Environmental allergies 5/9/2010    TMJ (dislocation of temporomandibular joint) 5/9/2010    Trauma      Immunizations   Immunization History   Administered Date(s) Administered    TD Vaccine 10/24/2009    TDAP Vaccine 09/29/2010    Td, Adsorbed PF 10/03/2015     Objective   Visit Vitals  /75 (BP 1 Location: Left arm, BP Patient Position: Sitting) Pulse 72   Temp 98.2 °F (36.8 °C) (Oral)   Resp 18   Ht 5' 2\" (1.575 m)   Wt 194 lb 9.6 oz (88.3 kg)   LMP 07/05/2011   SpO2 96%   BMI 35.59 kg/m²     Physical Examination  GEN: No apparent distress. Alert and oriented and responds to all questions appropriately. EYES:  Conjunctiva clear; pupils round and reactive to light; extraocular movements areintact. EAR: External ears are normal.  Tympanic membranes are clear and without effusion. NOSE: Turbinates are within normal limits. No drainage  OROPHYARYNX: No oral lesions or exudates. NECK:  Supple; no masses; thyroid normal           LUNGS: Respirations unlabored; clear to auscultation bilaterally  CARDIOVASCULAR: Regular, rate, and rhythm without murmurs, gallops or rubs   ABDOMEN: Soft; nontender; nondistended; normoactive bowel sounds; no masses or organomegaly  NEUROLOGIC:  No focal neurologic deficits. Strength and sensation grossly intact. Coordination and gait grossly intact. EXT: Well perfused. No edema. SKIN: No obvious rashes. Assessment   Adelina Collins is a 62 y.o. who presents for annual labs. Plan   1. Healthcare maintenance  · Ordered:   - METABOLIC PANEL, COMPREHENSIVE  - CBC W/O DIFF  - LIPID PANEL  - HEMOGLOBIN A1C WITH EAG  · Counseled patient on a healthy diet and daily exercises for weight loss. I have discussed the aforementioned diagnoses and plan with the patient in detail. I have provided information in person and/or in AVS. All questions answered prior to discharge.       I discussed this patient with Dr. Jay Stevenson (Attending Physician)   Signed By:  Sindi Taylor MD    Family Medicine Resident

## 2019-09-04 LAB
ALBUMIN SERPL-MCNC: 4.3 G/DL (ref 3.5–5.5)
ALBUMIN/GLOB SERPL: 1.7 {RATIO} (ref 1.2–2.2)
ALP SERPL-CCNC: 61 IU/L (ref 39–117)
ALT SERPL-CCNC: 18 IU/L (ref 0–32)
AST SERPL-CCNC: 21 IU/L (ref 0–40)
BILIRUB SERPL-MCNC: 0.4 MG/DL (ref 0–1.2)
BUN SERPL-MCNC: 12 MG/DL (ref 6–24)
BUN/CREAT SERPL: 15 (ref 9–23)
CALCIUM SERPL-MCNC: 9.2 MG/DL (ref 8.7–10.2)
CHLORIDE SERPL-SCNC: 105 MMOL/L (ref 96–106)
CHOLEST SERPL-MCNC: 266 MG/DL (ref 100–199)
CO2 SERPL-SCNC: 23 MMOL/L (ref 20–29)
CREAT SERPL-MCNC: 0.82 MG/DL (ref 0.57–1)
ERYTHROCYTE [DISTWIDTH] IN BLOOD BY AUTOMATED COUNT: 13.7 % (ref 12.3–15.4)
EST. AVERAGE GLUCOSE BLD GHB EST-MCNC: 114 MG/DL
GLOBULIN SER CALC-MCNC: 2.5 G/DL (ref 1.5–4.5)
GLUCOSE SERPL-MCNC: 102 MG/DL (ref 65–99)
HBA1C MFR BLD: 5.6 % (ref 4.8–5.6)
HCT VFR BLD AUTO: 41.3 % (ref 34–46.6)
HDLC SERPL-MCNC: 65 MG/DL
HGB BLD-MCNC: 13.2 G/DL (ref 11.1–15.9)
INTERPRETATION, 910389: NORMAL
LDLC SERPL CALC-MCNC: 177 MG/DL (ref 0–99)
MCH RBC QN AUTO: 31.5 PG (ref 26.6–33)
MCHC RBC AUTO-ENTMCNC: 32 G/DL (ref 31.5–35.7)
MCV RBC AUTO: 99 FL (ref 79–97)
PLATELET # BLD AUTO: 223 X10E3/UL (ref 150–450)
POTASSIUM SERPL-SCNC: 4.5 MMOL/L (ref 3.5–5.2)
PROT SERPL-MCNC: 6.8 G/DL (ref 6–8.5)
RBC # BLD AUTO: 4.19 X10E6/UL (ref 3.77–5.28)
SODIUM SERPL-SCNC: 142 MMOL/L (ref 134–144)
TRIGL SERPL-MCNC: 118 MG/DL (ref 0–149)
VLDLC SERPL CALC-MCNC: 24 MG/DL (ref 5–40)
WBC # BLD AUTO: 4.1 X10E3/UL (ref 3.4–10.8)

## 2019-09-04 NOTE — PROGRESS NOTES
Annual labs ok. A1c is within normal range. Total cholesterol and LDL continue to be elevated. Based on ASCVD risk calculator, patient's 10 year risk of a cardiovascular event is 2.6% and she does not need to be on a statin at this time. I am encouraging lifestyle modifications and will re-check in 1 year.

## 2019-09-18 NOTE — TELEPHONE ENCOUNTER
Dr. Sammy Cabezas / Telephone  Received: Today       Usha WOODRUFF [facility] Front Office       Phone Number: [phone]                     Pt wants to know if her results from the ultra-sound done last Tuesday are available yet.           I performed a face to face bedside interview with patient regarding history of present illness, review of symptoms and past medical history. I completed an independent physical exam.  I have discussed patient's plan of care.   I agree with note as stated above, having amended the EMR as needed to reflect my findings. I have discussed the assessment and plan of care.  This includes during the time I functioned as the attending physician for this patient.  Attending Contribution to Care: agree with plan of pa. 32 yo F PMHx C/S 8/30 p/w RLQ abd pain x 2 days. Pain started yesterday and gradually worsened today. No fevers/chills, +nausea/vomiting (one episode today). She has been having vaginal spotting since the delivery. Abdominal incision healing well without s/sx of infection. She is breastfeeding at home. She has a hx of cholecystectomy and tubal ligation during this C/S.  eval by gi with no specific recommendations, except colonoscopy. no abx recommended. possible chrons. will eval out pt with gi appt tomorrow. tolerating po. no other sig lab abnormalities.

## 2021-09-30 ENCOUNTER — OFFICE VISIT (OUTPATIENT)
Dept: FAMILY MEDICINE CLINIC | Age: 61
End: 2021-09-30
Payer: COMMERCIAL

## 2021-09-30 VITALS
HEART RATE: 72 BPM | BODY MASS INDEX: 34.74 KG/M2 | WEIGHT: 188.8 LBS | HEIGHT: 62 IN | DIASTOLIC BLOOD PRESSURE: 80 MMHG | OXYGEN SATURATION: 98 % | SYSTOLIC BLOOD PRESSURE: 147 MMHG | TEMPERATURE: 98 F

## 2021-09-30 DIAGNOSIS — R00.2 HEART PALPITATIONS: Primary | ICD-10-CM

## 2021-09-30 PROCEDURE — 93000 ELECTROCARDIOGRAM COMPLETE: CPT | Performed by: STUDENT IN AN ORGANIZED HEALTH CARE EDUCATION/TRAINING PROGRAM

## 2021-09-30 PROCEDURE — 99214 OFFICE O/P EST MOD 30 MIN: CPT | Performed by: STUDENT IN AN ORGANIZED HEALTH CARE EDUCATION/TRAINING PROGRAM

## 2021-09-30 NOTE — PROGRESS NOTES
Chief Complaint   Patient presents with    Palpitations     Denies SOB, or chest pain. happened twice.       Visit Vitals  BP (!) 147/80 (BP 1 Location: Left upper arm, BP Patient Position: Sitting)   Pulse 72   Temp 98 °F (36.7 °C) (Oral)   Ht 5' 2\" (1.575 m)   Wt 188 lb 12.8 oz (85.6 kg)   SpO2 98%   BMI 34.53 kg/m²
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
## 2021-09-30 NOTE — PATIENT INSTRUCTIONS
Dr. Marbella Lopez (Cardiology): [phone]     Palpitations: Care Instructions  Your Care Instructions     Heart palpitations are the uncomfortable sensation that your heart is beating fast or irregularly. You might feel pounding or fluttering in your chest. It might feel like your heart is skipping a beat. Although palpitations may be caused by a heart problem, they also occur because of stress, fatigue, or use of alcohol, caffeine, or nicotine. Many medicines, including diet pills, antihistamines, decongestants, and some herbal products, can cause heart palpitations. Nearly everyone has palpitations from time to time. Depending on your symptoms, your doctor may need to do more tests to try to find the cause of your palpitations. Follow-up care is a key part of your treatment and safety. Be sure to make and go to all appointments, and call your doctor if you are having problems. It's also a good idea to know your test results and keep a list of the medicines you take. How can you care for yourself at home? · Avoid caffeine, nicotine, and excess alcohol. · Do not take illegal drugs, such as methamphetamines and cocaine. · Do not take weight loss or diet medicines unless you talk with your doctor first.  · Get plenty of sleep. · Do not overeat. · If you have palpitations again, take deep breaths and try to relax. This may slow a racing heart. · If you start to feel lightheaded, lie down to avoid injuries that might result if you pass out and fall down. · Keep a record of your palpitations and bring it to your next doctor's appointment. Write down:  ? The date and time. ? Your pulse. (If your heart is beating fast, it may be hard to count your pulse.)  ? What you were doing when the palpitations started. ? How long the palpitations lasted. ? Any other symptoms. · If an activity causes palpitations, slow down or stop. Talk to your doctor before you do that activity again.   · Take your medicines exactly as prescribed. Call your doctor if you think you are having a problem with your medicine. When should you call for help? Call 911 anytime you think you may need emergency care. For example, call if:    · You passed out (lost consciousness).     · You have symptoms of a heart attack. These may include:  ? Chest pain or pressure, or a strange feeling in the chest.  ? Sweating. ? Shortness of breath. ? Pain, pressure, or a strange feeling in the back, neck, jaw, or upper belly or in one or both shoulders or arms. ? Lightheadedness or sudden weakness. ? A fast or irregular heartbeat. After you call 911, the  may tell you to chew 1 adult-strength or 2 to 4 low-dose aspirin. Wait for an ambulance. Do not try to drive yourself.     · You have symptoms of a stroke. These may include:  ? Sudden numbness, tingling, weakness, or loss of movement in your face, arm, or leg, especially on only one side of your body. ? Sudden vision changes. ? Sudden trouble speaking. ? Sudden confusion or trouble understanding simple statements. ? Sudden problems with walking or balance. ? A sudden, severe headache that is different from past headaches. Call your doctor now or seek immediate medical care if:    · You have heart palpitations and:  ? Are dizzy or lightheaded, or you feel like you may faint. ? Have new or increased shortness of breath. Watch closely for changes in your health, and be sure to contact your doctor if:    · You continue to have heart palpitations. Where can you learn more? Go to http://www.gray.com/  Enter R508 in the search box to learn more about \"Palpitations: Care Instructions. \"  Current as of: April 29, 2021               Content Version: 13.0  © 4648-9547 [employer]. Care instructions adapted under license by [employer] (which disclaims liability or warranty for this information).  If you have questions about a medical condition or this instruction, always ask your healthcare professional. [place] [address] any warranty or liability for your use of this information.

## 2021-10-01 LAB
ALBUMIN SERPL-MCNC: 4.6 G/DL (ref 3.8–4.8)
ALBUMIN/GLOB SERPL: 2.1 {RATIO} (ref 1.2–2.2)
ALP SERPL-CCNC: 66 IU/L (ref 44–121)
ALT SERPL-CCNC: 11 IU/L (ref 0–32)
AST SERPL-CCNC: 18 IU/L (ref 0–40)
BASOPHILS # BLD AUTO: 0 X10E3/UL (ref 0–0.2)
BASOPHILS NFR BLD AUTO: 1 %
BILIRUB SERPL-MCNC: 0.5 MG/DL (ref 0–1.2)
BUN SERPL-MCNC: 14 MG/DL (ref 8–27)
BUN/CREAT SERPL: 17 (ref 12–28)
CALCIUM SERPL-MCNC: 9.2 MG/DL (ref 8.7–10.3)
CHLORIDE SERPL-SCNC: 103 MMOL/L (ref 96–106)
CO2 SERPL-SCNC: 24 MMOL/L (ref 20–29)
CREAT SERPL-MCNC: 0.83 MG/DL (ref 0.57–1)
EOSINOPHIL # BLD AUTO: 0.1 X10E3/UL (ref 0–0.4)
EOSINOPHIL NFR BLD AUTO: 3 %
ERYTHROCYTE [DISTWIDTH] IN BLOOD BY AUTOMATED COUNT: 12.2 % (ref 11.7–15.4)
EST. AVERAGE GLUCOSE BLD GHB EST-MCNC: 111 MG/DL
GLOBULIN SER CALC-MCNC: 2.2 G/DL (ref 1.5–4.5)
GLUCOSE SERPL-MCNC: 95 MG/DL (ref 65–99)
HBA1C MFR BLD: 5.5 % (ref 4.8–5.6)
HCT VFR BLD AUTO: 40 % (ref 34–46.6)
HGB BLD-MCNC: 13.2 G/DL (ref 11.1–15.9)
IMM GRANULOCYTES # BLD AUTO: 0 X10E3/UL (ref 0–0.1)
IMM GRANULOCYTES NFR BLD AUTO: 0 %
LYMPHOCYTES # BLD AUTO: 1.6 X10E3/UL (ref 0.7–3.1)
LYMPHOCYTES NFR BLD AUTO: 40 %
MAGNESIUM SERPL-MCNC: 2.2 MG/DL (ref 1.6–2.3)
MCH RBC QN AUTO: 31.5 PG (ref 26.6–33)
MCHC RBC AUTO-ENTMCNC: 33 G/DL (ref 31.5–35.7)
MCV RBC AUTO: 96 FL (ref 79–97)
MONOCYTES # BLD AUTO: 0.4 X10E3/UL (ref 0.1–0.9)
MONOCYTES NFR BLD AUTO: 9 %
NEUTROPHILS # BLD AUTO: 1.9 X10E3/UL (ref 1.4–7)
NEUTROPHILS NFR BLD AUTO: 47 %
PHOSPHATE SERPL-MCNC: 4 MG/DL (ref 3–4.3)
PLATELET # BLD AUTO: 249 X10E3/UL (ref 150–450)
POTASSIUM SERPL-SCNC: 4.6 MMOL/L (ref 3.5–5.2)
PROT SERPL-MCNC: 6.8 G/DL (ref 6–8.5)
RBC # BLD AUTO: 4.19 X10E6/UL (ref 3.77–5.28)
SODIUM SERPL-SCNC: 139 MMOL/L (ref 134–144)
TSH SERPL DL<=0.005 MIU/L-ACNC: 1.17 UIU/ML (ref 0.45–4.5)
WBC # BLD AUTO: 3.9 X10E3/UL (ref 3.4–10.8)

## 2021-10-02 LAB
CHOLEST SERPL-MCNC: 264 MG/DL (ref 100–199)
HDLC SERPL-MCNC: 58 MG/DL
IMP & REVIEW OF LAB RESULTS: NORMAL
LDLC SERPL CALC-MCNC: 186 MG/DL (ref 0–99)
SPECIMEN STATUS REPORT, ROLRST: NORMAL
TRIGL SERPL-MCNC: 114 MG/DL (ref 0–149)
VLDLC SERPL CALC-MCNC: 20 MG/DL (ref 5–40)

## 2021-10-06 ENCOUNTER — VIRTUAL VISIT (OUTPATIENT)
Dept: FAMILY MEDICINE CLINIC | Age: 61
End: 2021-10-06

## 2021-10-07 ENCOUNTER — TELEPHONE (OUTPATIENT)
Dept: FAMILY MEDICINE CLINIC | Age: 61
End: 2021-10-07

## 2021-10-07 NOTE — TELEPHONE ENCOUNTER
----- Message from Rachel Weiner sent at 10/6/2021 10:24 AM EDT -----  Regarding: \telephone  Contact: [phone]  Appointment not available    Caller's first and last name and relationship to patient (if not the patient):self      Best contact number:[phone]      Preferred date and time:any      Scheduled appointment date and time:any      Reason for appointment:follow up       Details to clarify the request:missed appointment today       Rachel Weiner

## 2021-10-07 NOTE — TELEPHONE ENCOUNTER
Spoke with patient. Dr. Marvella Moritz, there are no more virtual appts on your schedule for OCT and patient apologizes she missed the appt as she thought the appt was 10:00 am    Offered her an in clinic appt and she declined saying she wants another virtual.    Please advise.

## 2021-10-18 ENCOUNTER — VIRTUAL VISIT (OUTPATIENT)
Dept: FAMILY MEDICINE CLINIC | Age: 61
End: 2021-10-18
Payer: COMMERCIAL

## 2021-10-18 DIAGNOSIS — E78.00 ELEVATED LDL CHOLESTEROL LEVEL: ICD-10-CM

## 2021-10-18 DIAGNOSIS — R00.2 HEART PALPITATIONS: Primary | ICD-10-CM

## 2021-10-18 DIAGNOSIS — E78.9 ELEVATED SERUM CHOLESTEROL: ICD-10-CM

## 2021-10-18 PROBLEM — M54.2 NECK PAIN: Status: ACTIVE | Noted: 2021-10-18
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
## 2021-10-18 PROCEDURE — 99214 OFFICE O/P EST MOD 30 MIN: CPT | Performed by: STUDENT IN AN ORGANIZED HEALTH CARE EDUCATION/TRAINING PROGRAM

## 2021-10-18 NOTE — PROGRESS NOTES
Marlo Baptiste  64 y.o. female  1960  [address]  [id number]    [phone] (home) [phone] (work)     [address]:    Telephone Encounter  Lily Mccarthy MD       Encounter Date: 10/18/2021 at 1:20 PM    Consent: Marlo Baptiste, who was seen by synchronous (real-time) audio only technology, and/or her healthcare decision maker, is aware that this patient-initiated, Telehealth encounter on 10/18/2021 is a billable service, with coverage as determined by her insurance carrier. She is aware that she may receive a bill and has provided verbal consent to proceed: Yes. Chief Complaint   Patient presents with    Palpitations       History of Present Illness   Marlo Baptiste is a 64 y.o. female was evaluated by telephone. I communicated with the patient and/or health care decision maker about Heart Palpitations. Last seen for this issue on 9/30. Described episodes like her \"heart flipped\" once on two separate occassions on the same day after having a 3 week course of gastrointestinal upset. Has not had any similar episodes since that time. Labs reviewed with patient. No clear cause of symptoms revealed from labs. Discussed lowering cholesterol. She feels like her Total Cholesterol and LDL have increased because she has reverted to using real butter as opposed to alternatives. Continues to use olive oil. Is working on lifestyle modifications. ASCVD 10yr risk 5.4%, does not need medication currently. Review of Systems   Review of Systems   Constitutional: Negative for chills, fever and weight loss. Respiratory: Negative for cough and shortness of breath. Cardiovascular: Negative for chest pain, palpitations and leg swelling. Gastrointestinal: Negative for abdominal pain, constipation, diarrhea, nausea and vomiting. Genitourinary: Negative for dysuria, frequency and urgency. Neurological: Negative for dizziness and headaches. Vitals/Objective:   General: Patient speaking in complete sentences without effort. Normal speech and cooperative. Due to this being a Virtual Check-in/Telephone evaluation, many elements of the physical examination are unable to be assessed. Assessment and Plan: Total Time: minutes: 21-30 minutes    1. Heart palpitations: has not had any recurrent episodes. Labs were drawn at previous visit that did not reveal cause. Possibly related to electrolyte disturbance caused by GI upset which corrected itself. - Advised to keep an eye on symptoms, has massage appointment at 3pm today which is when she first had her symptoms occur.  - She will reach out if she has a recurrence of her symptoms and we will do a 30 day Holter monitor at that point. Will forgo 24hr monitor since symptoms are so infrequent. 2. Elevated serum total and LDL cholesterol: likely related to diet with the increase in butter recently. ASCVD 10yr risk 5.4%. - Will not start medications at this time due to ASCVD 10yr risk being less than 7%  - Discussed lifestyle modifications including changing diet and trying to stay away from butter  - Will follow up with Lipid Panel in 6 months    Patient informed to follow up: in 6 months or earlier if symptoms recur    I affirm this is a Patient Initiated Episode with an Established Patient who has not had a related appointment within my department in the past 7 days or scheduled within the next 24 hours. Note: not billable if this call serves to triage the patient into an appointment for the relevant concern      Electronically Signed: Roly Mart MD  Providers location when delivering service: Home      ICD-10-CM ICD-9-CM    1. Heart palpitations  R00.2 785.1    2. Elevated serum cholesterol  E78.9 272.9    3.  Elevated LDL cholesterol level  E78.00 272.0        Pursuant to the emergency declaration under the [address] [facility], [address]5 waiver authority and the Coronavirus Preparedness and Response Supplemental Appropriations Act, this Virtual  Visit was conducted, with patient's consent, to reduce the patient's risk of exposure to COVID-19 and provide continuity of care for an established patient. History   Patients past medical, surgical and family histories were personally reviewed and updated. Past Medical History:   Diagnosis Date    Abnormal antinuclear antibody titer 5/9/2010    Abnormal Pap smear 5/9/2010    Arthritis of knee, right 2016    Follows Dr Juan Torres ([address] [place] [address]). Referred to PT in 02/2020     TMJ (dislocation of temporomandibular joint) 5/9/2010     Past Surgical History:   Procedure Laterality Date    HX HYSTERECTOMY  03/2019     Family History   Problem Relation Age of Onset    Heart Disease Father      Social History     Tobacco Use    Smoking status: Former Smoker     Packs/day: 0.25    Smokeless tobacco: Never Used    Tobacco comment: quit in 2016 (started in late in 45s)   Substance Use Topics    Alcohol use: No     Alcohol/week: 11.7 standard drinks     Types: 14 Glasses of wine per week     Comment: ocassional    Drug use: No              Current Medications/Allergies   Medications and Allergies reviewed:    Current Outpatient Medications   Medication Sig Dispense Refill    diazePAM (VALIUM) 2 mg tablet Takes 1/2 tab at bedtime      MULTIVITAMIN PO Take  by mouth. Allergies   Allergen Reactions    Latex Rash    Codeine Other (comments)    Doxycycline Other (comments)     Dark skin  Other reaction(s):  Other (see comments)  Dark skin    Sulfa (Sulfonamide Antibiotics) Rash

## 2021-10-18 NOTE — PROGRESS NOTES
Results Reviewed, discussed with patient. CBC, CMP, Mg, Phos wnl. HbA1c 5.5%. TSH wnl. Lipid Panel w/ Tot 264 HDL 58  , ASCVD 10yr risk 5.4%, recommended lifestyle changes including diet and exercise. No clear cause for \"heart flipping\" sensation. Will follow up to see if still having episodes.

## 2021-12-28 ENCOUNTER — TELEPHONE (OUTPATIENT)
Dept: FAMILY MEDICINE CLINIC | Age: 61
End: 2021-12-28

## 2021-12-28 DIAGNOSIS — R76.0 ABNORMAL ANTINUCLEAR ANTIBODY TITER: Primary | ICD-10-CM

## 2021-12-28 NOTE — PROGRESS NOTES
Patient called to ask about Abnormal Antinuclear Antibody Titer which is listed in her Past Medical History tab especially with her booster shot coming up. She would like to know if it is safe for her to take. After chart review, I cannot find where the abnormal lab is or where it is discussed in her previous notes. With the absence of documentation, we will go ahead an test again to see if it is actually abnormal. Placed order for TOÑO which she will get drawn tomorrow.

## 2021-12-28 NOTE — TELEPHONE ENCOUNTER
Hi pt called wanted to reach out with Dr Castillo Beatty regarding with her immune deficiency, also she message Dr Castillo Beatty via [facility] and got no response. Please call her at [phone].  Thank you    -Sandra Duggan

## 2021-12-29 ENCOUNTER — LAB ONLY (OUTPATIENT)
Dept: FAMILY MEDICINE CLINIC | Age: 61
End: 2021-12-29

## 2021-12-29 DIAGNOSIS — R76.0 ABNORMAL ANTINUCLEAR ANTIBODY TITER: ICD-10-CM

## 2021-12-30 LAB — ANA SER QL: NEGATIVE

## 2021-12-30 NOTE — PROGRESS NOTES
Results Reviewed. Discussed with patient. TOÑO negative. Will take history of TOÑO positive off of history tab.

## 2022-03-19 PROBLEM — M54.2 NECK PAIN: Status: ACTIVE | Noted: 2021-10-18

## 2022-03-19 PROBLEM — K76.89 HEPATIC CYST: Status: ACTIVE | Noted: 2018-08-02

## 2022-03-19 PROBLEM — M85.80 OSTEOPENIA: Status: ACTIVE | Noted: 2018-07-17
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
## 2022-03-19 PROBLEM — G47.33 OSA (OBSTRUCTIVE SLEEP APNEA): Status: ACTIVE | Noted: 2018-07-17

## 2022-03-20 PROBLEM — D25.9 FIBROID UTERUS: Status: ACTIVE | Noted: 2018-02-20

## 2022-06-24 ENCOUNTER — TELEPHONE (OUTPATIENT)
Dept: FAMILY MEDICINE CLINIC | Age: 62
End: 2022-06-24

## 2022-06-24 NOTE — TELEPHONE ENCOUNTER
Hello  Pt called got covid + last Wednesday 6/22, she's coughing, congestion, and fever.  Asking about antiviral meds for covid, please advice, thank you.        -inga

## 2023-06-08 ENCOUNTER — OFFICE VISIT (OUTPATIENT)
Age: 63
End: 2023-06-08
Payer: COMMERCIAL

## 2023-06-08 VITALS
HEIGHT: 62 IN | OXYGEN SATURATION: 98 % | TEMPERATURE: 97.6 F | DIASTOLIC BLOOD PRESSURE: 81 MMHG | HEART RATE: 71 BPM | RESPIRATION RATE: 16 BRPM | SYSTOLIC BLOOD PRESSURE: 127 MMHG | BODY MASS INDEX: 34.41 KG/M2 | WEIGHT: 187 LBS

## 2023-06-08 DIAGNOSIS — E66.9 CLASS 1 OBESITY WITHOUT SERIOUS COMORBIDITY WITH BODY MASS INDEX (BMI) OF 34.0 TO 34.9 IN ADULT, UNSPECIFIED OBESITY TYPE: ICD-10-CM

## 2023-06-08 DIAGNOSIS — M54.50 CHRONIC RIGHT-SIDED LOW BACK PAIN WITHOUT SCIATICA: Primary | ICD-10-CM

## 2023-06-08 DIAGNOSIS — G89.29 CHRONIC RIGHT-SIDED LOW BACK PAIN WITHOUT SCIATICA: Primary | ICD-10-CM

## 2023-06-08 DIAGNOSIS — I10 PRIMARY HYPERTENSION: ICD-10-CM

## 2023-06-08 PROCEDURE — 3079F DIAST BP 80-89 MM HG: CPT | Performed by: STUDENT IN AN ORGANIZED HEALTH CARE EDUCATION/TRAINING PROGRAM

## 2023-06-08 PROCEDURE — 3074F SYST BP LT 130 MM HG: CPT | Performed by: STUDENT IN AN ORGANIZED HEALTH CARE EDUCATION/TRAINING PROGRAM

## 2023-06-08 PROCEDURE — 99214 OFFICE O/P EST MOD 30 MIN: CPT | Performed by: STUDENT IN AN ORGANIZED HEALTH CARE EDUCATION/TRAINING PROGRAM

## 2023-06-08 RX ORDER — METHOCARBAMOL 500 MG/1
500 TABLET, FILM COATED ORAL EVERY 6 HOURS PRN
Qty: 30 TABLET | Refills: 0 | Status: SHIPPED | OUTPATIENT
Start: 2023-06-08

## 2023-06-08 SDOH — ECONOMIC STABILITY: HOUSING INSECURITY
IN THE LAST 12 MONTHS, WAS THERE A TIME WHEN YOU DID NOT HAVE A STEADY PLACE TO SLEEP OR SLEPT IN A SHELTER (INCLUDING NOW)?: NO

## 2023-06-08 SDOH — ECONOMIC STABILITY: FOOD INSECURITY: WITHIN THE PAST 12 MONTHS, YOU WORRIED THAT YOUR FOOD WOULD RUN OUT BEFORE YOU GOT MONEY TO BUY MORE.: NEVER TRUE

## 2023-06-08 SDOH — ECONOMIC STABILITY: FOOD INSECURITY: WITHIN THE PAST 12 MONTHS, THE FOOD YOU BOUGHT JUST DIDN'T LAST AND YOU DIDN'T HAVE MONEY TO GET MORE.: NEVER TRUE

## 2023-06-08 SDOH — ECONOMIC STABILITY: TRANSPORTATION INSECURITY
IN THE PAST 12 MONTHS, HAS LACK OF TRANSPORTATION KEPT YOU FROM MEETINGS, WORK, OR FROM GETTING THINGS NEEDED FOR DAILY LIVING?: NO

## 2023-06-08 SDOH — ECONOMIC STABILITY: INCOME INSECURITY: HOW HARD IS IT FOR YOU TO PAY FOR THE VERY BASICS LIKE FOOD, HOUSING, MEDICAL CARE, AND HEATING?: NOT HARD AT ALL

## 2023-06-08 NOTE — PROGRESS NOTES
I reviewed with the resident the medical history and the resident's findings on the physical examination. I discussed with the resident the patient's diagnosis and concur with the plan.
Kamilah Mueller is a 58 y.o. female      Chief Complaint   Patient presents with    Back Pain     Patient is coming in for right flank pain that radiates into her right abdomen side. This has been going for a few weeks. Pain is 4 out 10. Pain feels like ache. Movement and walking makes it worst. Laying down on a heating pad makes it better. No other concerns. 1. Have you been to the ER, urgent care clinic since your last visit? Hospitalized since your last visit? No    2. Have you seen or consulted any other health care providers outside of the [address] [place] [address] since your last visit? Include any pap smears or colon screening. No    Vitals:    06/08/23 1050   BP: 127/81   Site: Right Upper Arm   Position: Sitting   Pulse: 71   Resp: 16   Temp: 97.6 °F (36.4 °C)   TempSrc: Oral   SpO2: 98%   Weight: 187 lb (84.8 kg)   Height: 5' 2\" (1.575 m)            Health Maintenance Due   Topic Date Due    Depression Screen  Never done    HIV screen  Never done    Breast cancer screen  Never done    Shingles vaccine (1 of 2) Never done    Colorectal Cancer Screen  02/02/2018    COVID-19 Vaccine (3 - Booster for Pfizer series) 05/30/2021         Medication Reconciliation completed, changes noted.   Please  Update medication list.
tenderness or bony tenderness. Normal range of motion. Negative right straight leg raise test and negative left straight leg raise test.      Comments: No tenderness elicited on exam today with palpation. When sitting up, patient indicates pain in the right low thoracic to low back lateral to spinal musculature closer to flank. No pain at SI joint. Neurological:      Mental Status: She is alert. Assessment   López Cantu is a 58 y.o. who is here for right sided low thoracic back pain that radiates to right side of abdomen. Unclear etiology. Possibly muscular in nature however differential includes issues with kidney, gallbladder, liver. Given dermatomal distribution, possibly early presentation of shingles however no rash visible and not TTP today on exam.  Will try robaxin for therapeutic and diagnostic purposes. Labs obtained today for CPE upcoming. If pain still present at this visit, low threshold to obtain CT a/p. Chronic right-sided low thoracic back pain without sciatica  -     Comprehensive Metabolic Panel; Future  -     Urinalysis; Future  -     methocarbamol (ROBAXIN) 500 MG tablet; Take 1 tablet by mouth every 6 hours as needed (muscle spasm)  -     [facility]  - Consider CT a/p to evaluate kidneys, gallbladder/liver if discomfort persists    Primary hypertension  -     Comprehensive Metabolic Panel; Future  -     Magnesium; Future  -     CBC; Future    Class 1 obesity without serious comorbidity with body mass index (BMI) of 34.0 to 34.9 in adult, unspecified obesity type  -     Lipid Panel; Future  -     Hemoglobin A1C; Future  -     TSH;  Future       I discussed this patient with Dr. Merlin Covarrubias (Attending Physician)       Signed By:  Garfield Oliva DO    Family Medicine Resident

## 2023-06-09 LAB
ALBUMIN SERPL-MCNC: 4.8 G/DL (ref 3.8–4.8)
ALBUMIN/GLOB SERPL: 2.1 {RATIO} (ref 1.2–2.2)
ALP SERPL-CCNC: 69 IU/L (ref 44–121)
ALT SERPL-CCNC: 16 IU/L (ref 0–32)
APPEARANCE UR: CLEAR
AST SERPL-CCNC: 20 IU/L (ref 0–40)
BILIRUB SERPL-MCNC: 0.5 MG/DL (ref 0–1.2)
BILIRUB UR QL STRIP: NEGATIVE
BUN SERPL-MCNC: 13 MG/DL (ref 8–27)
BUN/CREAT SERPL: 13 (ref 12–28)
CALCIUM SERPL-MCNC: 9.5 MG/DL (ref 8.7–10.3)
CHLORIDE SERPL-SCNC: 102 MMOL/L (ref 96–106)
CHOLEST SERPL-MCNC: 277 MG/DL (ref 100–199)
CO2 SERPL-SCNC: 24 MMOL/L (ref 20–29)
COLOR UR: YELLOW
CREAT SERPL-MCNC: 0.97 MG/DL (ref 0.57–1)
EGFRCR SERPLBLD CKD-EPI 2021: 66 ML/MIN/1.73
ERYTHROCYTE [DISTWIDTH] IN BLOOD BY AUTOMATED COUNT: 12.4 % (ref 11.7–15.4)
GLOBULIN SER CALC-MCNC: 2.3 G/DL (ref 1.5–4.5)
GLUCOSE SERPL-MCNC: 97 MG/DL (ref 70–99)
GLUCOSE UR QL STRIP: NEGATIVE
HBA1C MFR BLD: 5.5 % (ref 4.8–5.6)
HCT VFR BLD AUTO: 41.2 % (ref 34–46.6)
HDLC SERPL-MCNC: 64 MG/DL
HGB BLD-MCNC: 13.7 G/DL (ref 11.1–15.9)
HGB UR QL STRIP: NEGATIVE
IMP & REVIEW OF LAB RESULTS: NORMAL
KETONES UR QL STRIP: NEGATIVE
LABORATORY COMMENT REPORT: ABNORMAL
LDLC SERPL CALC-MCNC: 190 MG/DL (ref 0–99)
LEUKOCYTE ESTERASE UR QL STRIP: ABNORMAL
MAGNESIUM SERPL-MCNC: 2.2 MG/DL (ref 1.6–2.3)
MCH RBC QN AUTO: 32.2 PG (ref 26.6–33)
MCHC RBC AUTO-ENTMCNC: 33.3 G/DL (ref 31.5–35.7)
MCV RBC AUTO: 97 FL (ref 79–97)
NITRITE UR QL STRIP: NEGATIVE
PH UR STRIP: 7.5 [PH] (ref 5–7.5)
PLATELET # BLD AUTO: 233 X10E3/UL (ref 150–450)
POTASSIUM SERPL-SCNC: 4.3 MMOL/L (ref 3.5–5.2)
PROT SERPL-MCNC: 7.1 G/DL (ref 6–8.5)
PROT UR QL STRIP: NEGATIVE
RBC # BLD AUTO: 4.26 X10E6/UL (ref 3.77–5.28)
SODIUM SERPL-SCNC: 140 MMOL/L (ref 134–144)
SP GR UR STRIP: 1.01 (ref 1–1.03)
TRIGL SERPL-MCNC: 127 MG/DL (ref 0–149)
TSH SERPL DL<=0.005 MIU/L-ACNC: 1.22 UIU/ML (ref 0.45–4.5)
UROBILINOGEN UR STRIP-MCNC: 0.2 MG/DL (ref 0.2–1)
VLDLC SERPL CALC-MCNC: 23 MG/DL (ref 5–40)
WBC # BLD AUTO: 3.9 X10E3/UL (ref 3.4–10.8)

## 2023-06-27 ENCOUNTER — OFFICE VISIT (OUTPATIENT)
Age: 63
End: 2023-06-27
Payer: COMMERCIAL

## 2023-06-27 VITALS
DIASTOLIC BLOOD PRESSURE: 83 MMHG | TEMPERATURE: 98 F | RESPIRATION RATE: 16 BRPM | WEIGHT: 188.6 LBS | HEART RATE: 69 BPM | BODY MASS INDEX: 34.5 KG/M2 | SYSTOLIC BLOOD PRESSURE: 135 MMHG | OXYGEN SATURATION: 98 %

## 2023-06-27 DIAGNOSIS — R03.0 ELEVATED BP WITHOUT DIAGNOSIS OF HYPERTENSION: ICD-10-CM

## 2023-06-27 DIAGNOSIS — M54.6 ACUTE RIGHT-SIDED THORACIC BACK PAIN: ICD-10-CM

## 2023-06-27 DIAGNOSIS — E78.00 ELEVATED LDL CHOLESTEROL LEVEL: ICD-10-CM

## 2023-06-27 DIAGNOSIS — Z00.00 ANNUAL PHYSICAL EXAM: Primary | ICD-10-CM

## 2023-06-27 PROCEDURE — 3079F DIAST BP 80-89 MM HG: CPT | Performed by: FAMILY MEDICINE

## 2023-06-27 PROCEDURE — 99396 PREV VISIT EST AGE 40-64: CPT | Performed by: STUDENT IN AN ORGANIZED HEALTH CARE EDUCATION/TRAINING PROGRAM

## 2023-06-27 PROCEDURE — 3075F SYST BP GE 130 - 139MM HG: CPT | Performed by: FAMILY MEDICINE

## 2023-06-27 ASSESSMENT — PATIENT HEALTH QUESTIONNAIRE - PHQ9
SUM OF ALL RESPONSES TO PHQ QUESTIONS 1-9: 0
SUM OF ALL RESPONSES TO PHQ9 QUESTIONS 1 & 2: 0
SUM OF ALL RESPONSES TO PHQ QUESTIONS 1-9: 0
1. LITTLE INTEREST OR PLEASURE IN DOING THINGS: 0
2. FEELING DOWN, DEPRESSED OR HOPELESS: 0
SUM OF ALL RESPONSES TO PHQ QUESTIONS 1-9: 0
SUM OF ALL RESPONSES TO PHQ QUESTIONS 1-9: 0

## 2023-06-29 DIAGNOSIS — S22.31XD CLOSED FRACTURE OF ONE RIB OF RIGHT SIDE WITH ROUTINE HEALING, SUBSEQUENT ENCOUNTER: Primary | ICD-10-CM

## 2023-07-11 ENCOUNTER — OFFICE VISIT (OUTPATIENT)
Age: 63
End: 2023-07-11
Payer: COMMERCIAL

## 2023-07-11 VITALS
BODY MASS INDEX: 34.2 KG/M2 | SYSTOLIC BLOOD PRESSURE: 133 MMHG | WEIGHT: 187 LBS | OXYGEN SATURATION: 92 % | DIASTOLIC BLOOD PRESSURE: 80 MMHG | HEART RATE: 75 BPM

## 2023-07-11 DIAGNOSIS — S22.31XD CLOSED FRACTURE OF ONE RIB OF RIGHT SIDE WITH ROUTINE HEALING, SUBSEQUENT ENCOUNTER: Primary | ICD-10-CM

## 2023-07-11 DIAGNOSIS — M25.562 PAIN IN BOTH KNEES, UNSPECIFIED CHRONICITY: ICD-10-CM

## 2023-07-11 DIAGNOSIS — M25.561 PAIN IN BOTH KNEES, UNSPECIFIED CHRONICITY: ICD-10-CM

## 2023-07-11 PROCEDURE — 3079F DIAST BP 80-89 MM HG: CPT | Performed by: FAMILY MEDICINE

## 2023-07-11 PROCEDURE — 3075F SYST BP GE 130 - 139MM HG: CPT | Performed by: FAMILY MEDICINE

## 2023-07-11 PROCEDURE — 99214 OFFICE O/P EST MOD 30 MIN: CPT | Performed by: FAMILY MEDICINE

## 2023-07-11 NOTE — PROGRESS NOTES
Chief Complaint   Patient presents with    Follow-up     Vitals:    07/11/23 1055   BP: 133/80   Pulse: 75   SpO2: 92%

## 2023-07-11 NOTE — PROGRESS NOTES
[address] [facility] Medicine      Chief Complaint:   Chief Complaint   Patient presents with    Follow-up       SUBJECTIVE:  Asia Francis is a 58 y.o. female who presents for   Follow up of right 8th rib fracture. Uncertain how she fractured the rib. No known injury or trauma. Pain started about 3 weeks ago. She was seen in clinic 2 weeks ago and radiographs showed acute right anterior 8th rib fracture. Dexa was ordered but has not be done yet. Overall her pain has improved. No SOB. She also c/o chronically intermittent bilateral knee pain. Pain mostly anterior. No swelling. No injury or trauma. No mechanical sx. Currently no pain. PMHx:  Past Medical History:   Diagnosis Date    Abnormal Pap smear 5/9/2010    Arthritis of knee, right 2016    Follows Dr Sara Chery ([address] [place] [address]). Referred to PT in 02/2020     TMJ (dislocation of temporomandibular joint) 5/9/2010       Meds:   Current Outpatient Medications   Medication Sig Dispense Refill    Ascorbic Acid (VITAMIN C PO) Take by mouth      Probiotic Product (PROBIOTIC-10 PO) Take by mouth      VITAMIN D PO Take by mouth      ZINC PO Take by mouth      Multiple Vitamin (MULTIVITAMIN PO) Take by mouth      diazePAM (VALIUM) 2 MG tablet Takes 1/2 tab at bedtime      methocarbamol (ROBAXIN) 500 MG tablet Take 1 tablet by mouth every 6 hours as needed (muscle spasm) (Patient not taking: Reported on 6/27/2023) 30 tablet 0     No current facility-administered medications for this visit. Allergies: Allergies   Allergen Reactions    Latex Rash    Codeine Other (See Comments)    Doxycycline Other (See Comments)     Dark skin  Other reaction(s):  Other (see comments)  Dark skin    Sulfa Antibiotics Rash       Smoker:  Social History     Tobacco Use   Smoking Status Former    Packs/day: 0.25    Types: Cigarettes   Smokeless Tobacco Never   Tobacco Comments    Quit smoking: quit in

## 2024-07-01 ENCOUNTER — TELEPHONE (OUTPATIENT)
Age: 64
End: 2024-07-01

## 2024-07-01 NOTE — TELEPHONE ENCOUNTER
Copy and pasted emails from Walter to billing.  First conversation I see is from 2024:    Good morning,    The following patient reached out to submit an official complaint. Per her report, she called and spoke with [facility] in 2023, prior to scheduling her appointment, and was told her [employer] EPO plan was in network. She came to the visit, and it looks like RTE ran fine. She then came back a couple more times, and when she reached out to her insurance, they informed her we are OON with her plan.    She currently has an outstanding balance of $1,867.00 and reports today is the deadline. She does not want to be sent to [facility], and per her report, she has been in touch with Billing multiple times, but no response.    I reached out to [facility] to see if a recording of the call is still available, and unfortunately no, since the platform has since changed. I explained to the patient that it is the patient's responsibility to verify insurance coverage prior to visits, and while she is in agreement with this, she feels was \"duped\" and given misinformation by [facility], which has been \"very disappointing.\"    Can someone please review?    Copy and pasted response email from 24 from Lyly Brown-Customer Esc:    From: Lyly Brown   Sent: Thursday, 2024 1:55 PM  To: Walter Fry <[email or web address]>; Юлия Albert <[email or web address]>  Cc: Lisbet Avila <[email or web address]>  Subject: RE: [EXTERNAL EMAIL] [employer] - Billing Inquiry - S.O.     Good afternoon,     Pt: Pascale Herr   : 1960  Acct#[id number], [id number], [id number]   DOS 2023, 2023, 2023     A request has been sent to the Leadership of the Follow-up team to verify any Credentialing or out-of-network (OON) issues. If it is determined that this claim is indeed out-of-network, I will make the necessary adjustments to reflect the self-pay rate. It is important to allow the Billing